# Patient Record
Sex: FEMALE | Race: BLACK OR AFRICAN AMERICAN | Employment: UNEMPLOYED | URBAN - METROPOLITAN AREA
[De-identification: names, ages, dates, MRNs, and addresses within clinical notes are randomized per-mention and may not be internally consistent; named-entity substitution may affect disease eponyms.]

---

## 2018-06-29 ENCOUNTER — HOSPITAL ENCOUNTER (OUTPATIENT)
Dept: WOMENS IMAGING | Age: 58
Discharge: HOME | End: 2018-07-01
Payer: COMMERCIAL

## 2018-06-29 DIAGNOSIS — Z12.31 ENCOUNTER FOR SCREENING MAMMOGRAM FOR BREAST CANCER: ICD-10-CM

## 2018-06-29 PROCEDURE — 77067 SCR MAMMO BI INCL CAD: CPT

## 2018-11-06 ENCOUNTER — APPOINTMENT (OUTPATIENT)
Dept: GENERAL RADIOLOGY | Age: 58
End: 2018-11-06
Payer: COMMERCIAL

## 2018-11-06 ENCOUNTER — HOSPITAL ENCOUNTER (EMERGENCY)
Age: 58
Discharge: HOME | End: 2018-11-06
Attending: EMERGENCY MEDICINE
Payer: COMMERCIAL

## 2018-11-06 VITALS
SYSTOLIC BLOOD PRESSURE: 125 MMHG | WEIGHT: 202 LBS | HEART RATE: 68 BPM | TEMPERATURE: 97.8 F | BODY MASS INDEX: 31.71 KG/M2 | DIASTOLIC BLOOD PRESSURE: 81 MMHG | HEIGHT: 67 IN | OXYGEN SATURATION: 95 % | RESPIRATION RATE: 17 BRPM
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
## 2018-11-06 DIAGNOSIS — M54.6 PAIN IN THORACIC SPINE: ICD-10-CM

## 2018-11-06 DIAGNOSIS — S80.10XA MULTIPLE LEG CONTUSIONS, UNSPECIFIED LATERALITY, INITIAL ENCOUNTER: ICD-10-CM

## 2018-11-06 PROCEDURE — 73590 X-RAY EXAM OF LOWER LEG: CPT

## 2018-11-06 PROCEDURE — 72072 X-RAY EXAM THORAC SPINE 3VWS: CPT

## 2018-11-06 PROCEDURE — 99283 EMERGENCY DEPT VISIT LOW MDM: CPT

## 2018-11-06 PROCEDURE — 6370000000 HC RX 637 (ALT 250 FOR IP): Performed by: EMERGENCY MEDICINE

## 2018-11-06 RX ORDER — CYCLOBENZAPRINE HCL 10 MG
10 TABLET ORAL 3 TIMES DAILY PRN
Qty: 20 TABLET | Refills: 0 | Status: SHIPPED | OUTPATIENT
Start: 2018-11-06 | End: 2018-11-16

## 2018-11-06 RX ORDER — CYCLOBENZAPRINE HCL 10 MG
10 TABLET ORAL ONCE
Status: COMPLETED | OUTPATIENT
Start: 2018-11-06 | End: 2018-11-06

## 2018-11-06 RX ORDER — ACETAMINOPHEN 500 MG
1000 TABLET ORAL ONCE
Status: COMPLETED | OUTPATIENT
Start: 2018-11-06 | End: 2018-11-06

## 2018-11-06 RX ADMIN — ACETAMINOPHEN 1000 MG: 500 TABLET ORAL at 08:05

## 2018-11-06 RX ADMIN — CYCLOBENZAPRINE HYDROCHLORIDE 10 MG: 10 TABLET, FILM COATED ORAL at 08:27

## 2018-11-06 ASSESSMENT — ENCOUNTER SYMPTOMS
EYE PAIN: 0
SHORTNESS OF BREATH: 0
BACK PAIN: 1
SORE THROAT: 0
ABDOMINAL PAIN: 0
NAUSEA: 0
CHEST TIGHTNESS: 0
VOMITING: 0

## 2018-11-06 ASSESSMENT — PAIN SCALES - GENERAL
PAINLEVEL_OUTOF10: 7
PAINLEVEL_OUTOF10: 7

## 2018-11-06 ASSESSMENT — PAIN DESCRIPTION - ORIENTATION: ORIENTATION: MID

## 2018-11-06 ASSESSMENT — PAIN DESCRIPTION - DESCRIPTORS: DESCRIPTORS: ACHING

## 2018-11-06 ASSESSMENT — PAIN DESCRIPTION - LOCATION: LOCATION: BACK;LEG

## 2018-11-06 ASSESSMENT — PAIN DESCRIPTION - PAIN TYPE: TYPE: ACUTE PAIN
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
## 2021-03-31 ENCOUNTER — HOSPITAL ENCOUNTER (OUTPATIENT)
Dept: PHYSICAL THERAPY | Age: 61
Setting detail: THERAPIES SERIES
Discharge: HOME | End: 2021-03-31
Payer: COMMERCIAL

## 2021-03-31 PROCEDURE — 97162 PT EVAL MOD COMPLEX 30 MIN: CPT

## 2021-03-31 ASSESSMENT — PAIN DESCRIPTION - FREQUENCY: FREQUENCY: CONTINUOUS

## 2021-03-31 ASSESSMENT — PAIN - FUNCTIONAL ASSESSMENT: PAIN_FUNCTIONAL_ASSESSMENT: PREVENTS OR INTERFERES WITH ALL ACTIVE AND SOME PASSIVE ACTIVITIES

## 2021-03-31 NOTE — PROGRESS NOTES
therapy. MRI denied per pt. Objective:      Ambulation 1  Device: No Device  Assistance: Independent  Gait Deviations: Slow Soraida  Distance: unlimited in clinic, reports limited in community due to increase pain   Strength RLE  Comment: Hip 4/5 except ext 4-/5, knee 4/5, ankle 4/5  Strength LLE  Comment: Hip 4/5 except ext 4-/5, knee 4/5, ankle 4/5      AROM RLE (degrees)  RLE General AROM: Hip flex 90, abd 40, IR 40, ER 35     AROM LLE (degrees)  LLE General AROM: Hip flex 90, abd 40, IR 35, ER 35        Spine  Lumbar: Flex 75% WNL with mod pain and gowers sign, Ext 50% with min pain, SB 50% B with pain. Bed mobility  Rolling to Left: Independent  Rolling to Right: Independent  Supine to Sit: Independent  Sit to Supine: Independent          Additional Measures  Flexibility: Hamsting flexibility -5°R, -18°L at 90/90 hip/knee position. Special Tests: SLUMP(pain on R), SLR(-),  MARIN(L hip pain), ELY's(tightness B),  Scour(guarded however no pain), distraction (increase pain R hip),  compression(lumbar no change. )     Exercises:   Exercises  Exercise 1: Aquatic  Exercise 2: Amb FRS*  Exercise 3: sink ex's*  Exercise 4: ham stretch*  Exercise 5: piriformis stretch*  Exercise 6: push pull kb*  Exercise 7: push down KB*  Exercise 8: KB trunk rotation*  Exercise 20: HEP: sink ex's pirifomis and hamstretch     *Indicates exercise,modality, or manual techniques to be initiated when appropriate  Assessment:   Body structures, Functions, Activity limitations: Decreased ROM, Decreased strength, Decreased posture, Increased pain, Decreased balance  Assessment: The pt's impairments currently limit functional abilities by 48% including her abilities to  ambulate community distance without increase pain, perform recreational actvities, and perform household/work related duties without pain or limitations  Prognosis: Good  Discharge Recommendations: Continue to assess pending progress        Decision Making: Medium Complexity  History: chronic pain, heart attack, cervical cancer 2017, blood clot L leg on blood thinners x 3 years, R TKR 2008  Exam: Mod oswestry 24/50=52%functional  Clinical Presentation: evolving        Plan  Frequency/Duration:  Plan  Times per week: 2  Plan weeks: 4-6  Current Treatment Recommendations: Strengthening, ROM, Aquatics, Home Exercise Program, Modalities, Manual Therapy - Joint Manipulation, Manual Therapy - Soft Tissue Mobilization, Balance Training     Patient Education  New Education Provided: PT Education: Goals;PT Role;Plan of Care;Home Exercise Program  Patient Education: written aquatic guidelines and HEP provided    POST-PAIN     Pain Rating (0-10 pain scale):  10 /10  Location and pain description same as pre-treatment unless indicated. Action: [x] NA  [] Call Physician  [] Perform HEP  [] Meds as prescribed    Evaluation and patient rights have been reviewed and patient agrees with plan of care. Yes  [x]  No  []   Explain:       Himanshu Fall Risk Assessment  Risk Factor Scale  Score   History of Falls [] Yes  [x] No 25  0 0   Secondary Diagnosis [] Yes  [x] No 15  0 0   Ambulatory Aid [] Furniture  [] Crutches/cane/walker  [x] None/bedrest/wheelchair/nurse 30  15  0 0   IV/Heparin Lock [] Yes  [x] No 20  0 0   Gait/Transferring [] Impaired  [] Weak  [x] Normal/bedrest/immobile 20  10  0 0   Mental Status [] Forgets limitations  [x] Oriented to own ability 15  0 0      Total:0     Based on the Assessment score: check the appropriate box.   [x]  No intervention needed   Low =   Score of 0-24  []  Use standard prevention interventions Moderate =  Score of 24-44   [] Discuss fall prevention strategies   [] Indicate moderate falls risk on eval  []  Use high risk prevention interventions High = Score of 45 and higher   [] Discuss fall prevention strategies   [] Provide supervision during treatment time    Goals  Long term goals  Time Frame for Long term goals : 4-6 weeks  Long term goal 1:

## 2021-03-31 NOTE — PROGRESS NOTES
[address]     Ph: [phone]  Fax: [phone]    [] Certification  [] Recertification [x]  Plan of Care  [] Progress Note [] Discharge      To:  Dr Remy Prather      From:  Terry Griffin, PT  Patient: Mat Rangel     : 1960  Diagnosis: Sacroilits     Date: 3/31/2021  Treatment Diagnosis: LBP       Progress Report Period from:  3/31/2021  to 3/31/2021    Total # of Visits to Date: 1   No Show: 0    Canceled Appointment: 0     OBJECTIVE:       Long Term Goals - Time Frame for Long term goals : 4-6 weeks  Goals Current/ Discharge status Met   Long term goal 1: Indep HEP for symptom management Written HEP provided  for symptom management   [] yes  [x] no   Long term goal 2: Pt demo improved overall function by reporting greater than 70% per functional survey score Exam: Mod oswestry 24/50=52%functional   [] yes  [x] no   Long term goal 3: Improve B hip ext strength 4/5 to allow patient to improve overall walking tolerance community based. Strength RLE  Comment: Hip 4/5 except ext 4-/5, knee 4/5, ankle 4/5  Strength LLE  Comment: Hip 4/5 except ext 4-/5, knee 4/5, ankle 4/5       [] yes  [x] no   Long term goal 4: Pain-free lumbar AROM to WNL allowing an increase in ADL tolerance. Spine  Lumbar: Flex 75% WNL with mod pain and gowers sign, Ext 50% with min pain, SB 50% B with pain.     [] yes  [x] no       Body structures, Functions, Activity limitations: Decreased ROM, Decreased strength, Decreased posture, Increased pain, Decreased balance  Assessment: The pt's impairments currently limit functional abilities by 48% including her abilities to  ambulate community distance without increase pain, perform recreational actvities, and perform household/work related duties without pain or limitations  Special Tests: SLUMP(pain on R), SLR(-),  MARIN(L hip pain), ELY's(tightness B),  Scour(guarded however no pain), distraction (increase pain R hip),  compression(lumbar no change. )  Prognosis: Good  Discharge Recommendations: Continue to assess pending progress      PT Education: Goals;PT Role;Plan of Care;Home Exercise Program  Patient Education: written aquatic guidelines and HEP provided    PLAN: [x] Evaluate and Treat  Frequency/Duration:  Plan  Times per week: 2  Plan weeks: 4-6  Current Treatment Recommendations: Strengthening, ROM, Aquatics, Home Exercise Program, Modalities, Manual Therapy - Joint Manipulation, Manual Therapy - Soft Tissue Mobilization, Balance Training                   Patient Status:[x] Continue/ Initiate plan of Care    [] Discharge PT. Recommend pt continue with HEP. [] Additional visits requested, Please re-certify for additional visits:          Signature: Electronically signed by Patricia Betancur PT on 3/31/21 at 9:04 AM EDT      If you have any questions or concerns, please don't hesitate to call. Thank you for your referral.    I have reviewed this plan of care and certify a need for medically necessary rehabilitation services.     Physician Signature:__________________________________________________________  Date:  Please sign and return

## 2021-04-06 ENCOUNTER — HOSPITAL ENCOUNTER (OUTPATIENT)
Dept: PHYSICAL THERAPY | Age: 61
Setting detail: THERAPIES SERIES
Discharge: HOME | End: 2021-04-06
Payer: COMMERCIAL

## 2021-04-06 PROCEDURE — 97113 AQUATIC THERAPY/EXERCISES: CPT

## 2021-04-06 NOTE — PROGRESS NOTES
[address] [address]  Outpatient Physical Therapy    Treatment Note        Date: 2021  Patient: Braden Prather  : 1960  ACCT #: [de-identified]  Referring Practitioner: Dr Ashlie Mcdaniel  Diagnosis: Sacroilits    Visit Information:  PT Visit Information  Onset Date: 20(ongoing, chronic)  [facility]  Total # of Visits Approved: 30  Total # of Visits to Date: 2  No Show: 0  Canceled Appointment: 0  Progress Note Counter:     Subjective: Pt reports 5/10 LBP achy. \"It's been bothering me for a while. \"  Comments: RTD after therapy. MRI denied per pt. HEP Compliance:  [x] Good [] Fair [] Poor [] Reports not doing due to:    Vital Signs  Patient Currently in Pain: Yes   Pain Screening  Patient Currently in Pain: Yes  Pain Assessment  Pain Level: 5  Pain Location: Back  Pain Descriptors: Aching    OBJECTIVE:   Exercises  Exercise 1: Aquatic  Exercise 2: Amb FRS x 2  Exercise 3: sink ex's Circles x 10  Exercise 4: ham stretch at stairs 30 sec x 3  Exercise 5: piriformis stretch sitting 30 sec x 3  Exercise 6: push pull kb x 10  Exercise 8: KB trunk rotation x 10       Strength: [x] NT  [] MMT completed:     ROM: [x] NT  [] ROM measurements:            *Indicates exercise, modality, or manual techniques to be initiated when appropriate    Assessment:           Assessment: Initiated aquatic exercises as stated in exercise section. Much cueing for core and avoiding painful movements. Good tolerance to treatment. Treatment Diagnosis: LBP          Goals:       Long term goals  Time Frame for Long term goals : 4-6 weeks  Long term goal 1: Indep HEP for symptom management  Long term goal 2: Pt demo improved overall function by reporting greater than 70% per functional survey score  Long term goal 3: Improve B hip ext strength 4/5 to allow patient to improve overall walking tolerance community based.   Long term goal 4: Pain-free lumbar AROM to WNL allowing an increase in ADL tolerance. Progress toward goals:Progressing towards all goals. POST-PAIN       Pain Rating (0-10 pain scale):  2 /10   Location and pain description same as pre-treatment unless indicated. Action: [] NA   [x] Perform HEP  [] Meds as prescribed  [] Modalities as prescribed   [] Call Physician     Frequency/Duration:  Plan  Times per week: 2  Plan weeks: 4-6  Current Treatment Recommendations: Strengthening, ROM, Aquatics, Home Exercise Program, Modalities, Manual Therapy - Joint Manipulation, Manual Therapy - Soft Tissue Mobilization, Balance Training     Pt to continue current HEP. See objective section for any therapeutic exercise changes, additions or modifications this date.          PT Individual Minutes  Time In: 7104  Time Out: 9788  Minutes: 33  Timed Code Treatment Minutes: 33 Minutes  Procedure Minutes:0     Timed Activity Minutes Units   Aquatics 33 2       Signature:  Electronically signed by Sriram Agee PTA on 4/6/21 at 4:09 PM EDT

## 2021-04-08 ENCOUNTER — HOSPITAL ENCOUNTER (OUTPATIENT)
Dept: PHYSICAL THERAPY | Age: 61
Setting detail: THERAPIES SERIES
Discharge: HOME | End: 2021-04-08
Payer: COMMERCIAL

## 2021-04-08 PROCEDURE — 97113 AQUATIC THERAPY/EXERCISES: CPT

## 2021-04-08 ASSESSMENT — PAIN DESCRIPTION - FREQUENCY: FREQUENCY: CONTINUOUS

## 2021-04-08 ASSESSMENT — PAIN DESCRIPTION - LOCATION: LOCATION: BACK

## 2021-04-08 NOTE — PROGRESS NOTES
[address] [address]  Outpatient Physical Therapy    Treatment Note        Date: 2021  Patient: Vinita Greer  : 1960  ACCT #: [de-identified]  Referring Practitioner: Dr Linda Varghese  Diagnosis: Sacroilits    Visit Information:  PT Visit Information  Onset Date: 20(ongoing, chronic)  [facility]: [facility]  Total # of Visits Approved: 30  Total # of Visits to Date: 3  No Show: 0  Canceled Appointment: 0  Progress Note Counter: 3 /8-12    Subjective: Pt presenting to appt stating \"I'm hurtin\" after last pool session. Pt c/o inc pain and \"spasms\" in B LE's, primary in Rt inner thigh. Comments: RTD after therapy. MRI denied per pt. HEP Compliance:  [x] Good [] Fair [] Poor [] Reports not doing due to:    Vital Signs  Patient Currently in Pain: Yes   Pain Screening  Patient Currently in Pain: Yes  Pain Assessment  Pain Assessment: 0-10  Pain Level: 7  Pain Location: Back  Pain Descriptors: Spasm; Sore  Pain Frequency: Continuous    OBJECTIVE:   Exercises  Exercise 1: Aquatic  Exercise 2: Amb FRS x 2  Exercise 3: sink ex's Circles x 10  Exercise 4: ham stretch at stairs 30 sec x 3  Exercise 5: piriformis stretch sitting 30 sec x 3  Exercise 6: push pull kb x 10  Exercise 7: push down KB x10  Exercise 8: KB trunk rotation x 10  Exercise 9: DB bike 3 min, hang 5 min    Strength: [x] NT  [] MMT completed:     ROM: [x] NT  [] ROM measurements:     Assessment:    Assessment: Cont'd aquatics program per POC w/ addition of DB bicycle and hang to further address LBP and spinal pressure. Pt noting gradual decrease in pain level throughout duration of session noting most relief when NWB in deep end. Pt exiting pool w/ 3/10 pain vs initial 7/10 pre tx.   Treatment Diagnosis: LBP     Goals:   Long term goals  Time Frame for Long term goals : 4-6 weeks  Long term goal 1: Indep HEP for symptom management  Long term goal 2: Pt demo improved overall function by reporting greater than 70% per functional

## 2021-04-12 ENCOUNTER — HOSPITAL ENCOUNTER (OUTPATIENT)
Dept: PHYSICAL THERAPY | Age: 61
Setting detail: THERAPIES SERIES
Discharge: HOME | End: 2021-04-12
Payer: COMMERCIAL

## 2021-04-12 PROCEDURE — 97113 AQUATIC THERAPY/EXERCISES: CPT

## 2021-04-12 ASSESSMENT — PAIN SCALES - GENERAL: PAINLEVEL_OUTOF10: 7

## 2021-04-12 NOTE — PROGRESS NOTES
[address] [address]  Outpatient Physical Therapy    Treatment Note        Date: 2021  Patient: Vinita Greer  : 1960  ACCT #: [de-identified]  Referring Practitioner: Dr Linda Varghese  Diagnosis: Sacroilits    Visit Information:  PT Visit Information  Onset Date: 20(ongoing, chronic)  PT Insurance Information: [employer]  Total # of Visits Approved: 30  Total # of Visits to Date: 4  No Show: 0  Canceled Appointment: 0  Progress Note Counter:     Subjective: pain today is 7/10 in my LB, no radiating pain  Comments: RTD after therapy. MRI denied per pt.   HEP Compliance:  [x] Good [] Fair [] Poor [] Reports not doing due to:    Vital Signs  Patient Currently in Pain: Yes   Pain Screening  Patient Currently in Pain: Yes  Pain Assessment  Pain Level: 7  Pain Location: Back  Pain Descriptors: Sore    OBJECTIVE:   Exercises  Exercise 2: Amb FRS x 4  Exercise 3: sink ex's and circles x 20  Exercise 4: ham stretch at stairs 30 sec x 3  Exercise 5: piriformis stretch sitting 30 sec x 3  Exercise 6: push pull kb x 25  Exercise 7: push down KB x 25  Exercise 8: KB trunk rotation x 25  Exercise 9: DB bike, scissor kicks, 5 min, hang 3 min       Strength: [x] NT  [] MMT completed:     ROM: [x] NT  [] ROM measurements:       *Indicates exercise, modality, or manual techniques to be initiated when appropriate    Assessment:           Assessment: no UE support used during session, increased laps,and number of reps w/ exercises, very good tolerance to all, no c/o pain during session, vc's to keep stretches in comfort range  Treatment Diagnosis: LBP          Goals:       Long term goals  Time Frame for Long term goals : 4-6 weeks  Long term goal 1: Indep HEP for symptom management  Long term goal 2: Pt demo improved overall function by reporting greater than 70% per functional survey score  Long term goal 3: Improve B hip ext strength 4/5 to allow patient to improve overall walking tolerance community based.  Long term goal 4: Pain-free lumbar AROM to WNL allowing an increase in ADL tolerance. Progress toward goals:ongoing    POST-PAIN       Pain Rating (0-10 pain scale):  5 /10   Location and pain description same as pre-treatment unless indicated. Action: [] NA   [x] Perform HEP  [] Meds as prescribed  [] Modalities as prescribed   [] Call Physician     Frequency/Duration:  Plan  Times per week: 2  Plan weeks: 4-6  Current Treatment Recommendations: Strengthening, ROM, Aquatics, Home Exercise Program, Modalities, Manual Therapy - Joint Manipulation, Manual Therapy - Soft Tissue Mobilization, Balance Training     Pt to continue current HEP. See objective section for any therapeutic exercise changes, additions or modifications this date.          PT Individual Minutes  Time In: 4594  Time Out: 1200  Minutes: 38  Timed Code Treatment Minutes: 38 Minutes  Procedure Minutes:0     Timed Activity Minutes Units   Aquatic Ex 38 3       Signature:  Electronically signed by Ina Villasenor PTA on 4/12/21 at 11:56 AM EDT

## 2021-04-15 ENCOUNTER — HOSPITAL ENCOUNTER (OUTPATIENT)
Dept: PHYSICAL THERAPY | Age: 61
Setting detail: THERAPIES SERIES
Discharge: HOME | End: 2021-04-15
Payer: COMMERCIAL

## 2021-04-15 PROCEDURE — 97113 AQUATIC THERAPY/EXERCISES: CPT

## 2021-04-15 ASSESSMENT — PAIN DESCRIPTION - LOCATION: LOCATION: BACK

## 2021-04-15 ASSESSMENT — PAIN DESCRIPTION - DESCRIPTORS: DESCRIPTORS: ACHING;SORE

## 2021-04-15 NOTE — PROGRESS NOTES
[address] [address]  Outpatient Physical Therapy    Treatment Note        Date: 4/15/2021  Patient: Rose London  : 1960  ACCT #: [de-identified]  Referring Practitioner: Dr Izabel Terrell  Diagnosis: Sacroilits    Visit Information:  PT Visit Information  Onset Date: 20(ongoing, chronic)  [facility]  Total # of Visits Approved: 30  Total # of Visits to Date: 5  No Show: 0  Canceled Appointment: 0  Progress Note Counter:     Subjective: pain today is 7/10 in my LB  Comments: RTD after therapy. MRI denied per pt. HEP Compliance:  [x] Good [] Fair [] Poor [] Reports not doing due to:    Vital Signs  Patient Currently in Pain: Yes   Pain Screening  Patient Currently in Pain: Yes  Pain Assessment  Pain Location: Back  Pain Orientation: Lower;Right  Pain Descriptors: Aching; Sore    OBJECTIVE:   Exercises  Exercise 2: Amb FRS and SLS high knee march x 4  Exercise 3: sink ex's and circles x 20  Exercise 4: ham stretch at stairs 30 sec x 3  Exercise 5: piriformis stretch sitting 30 sec x 3  Exercise 6: push pull kb x 25  Exercise 7: push down KB x 25  Exercise 8: KB trunk rotation x 25  Exercise 9: DB bike, scissor kicks, 5 min, hang 3 min    Strength: [x] NT  [] MMT completed:   ROM: [] NT  [x] ROM measurements:         Spine  Lumbar: flexion slightly >75%     *Indicates exercise, modality, or manual techniques to be initiated when appropriate    Assessment:           Assessment: continued with current ecercises and stretches, nothing new added due to pain level stated, good tolerance to session, occasional hand hold used today  Treatment Diagnosis: LBP          Goals:       Long term goals  Time Frame for Long term goals : 4-6 weeks  Long term goal 1: Indep HEP for symptom management  Long term goal 2: Pt demo improved overall function by reporting greater than 70% per functional survey score  Long term goal 3: Improve B hip ext strength 4/5 to allow patient to improve overall walking tolerance community based. Long term goal 4: Pain-free lumbar AROM to WNL allowing an increase in ADL tolerance. Progress toward goals:ongoing    POST-PAIN       Pain Rating (0-10 pain scale):   6/10   Location and pain description same as pre-treatment unless indicated. Action: [] NA   [x] Perform HEP  [] Meds as prescribed  [] Modalities as prescribed   [] Call Physician     Frequency/Duration:  Plan  Times per week: 2  Plan weeks: 4-6  Current Treatment Recommendations: Strengthening, ROM, Aquatics, Home Exercise Program, Modalities, Manual Therapy - Joint Manipulation, Manual Therapy - Soft Tissue Mobilization, Balance Training     Pt to continue current HEP. See objective section for any therapeutic exercise changes, additions or modifications this date.          PT Individual Minutes  Time In: 5620  Time Out: 9317  Minutes: 38  Timed Code Treatment Minutes: 38 Minutes  Procedure Minutes:0     Timed Activity Minutes Units   Aquatic Ex 38 3       Signature:  Electronically signed by Mike Renae PTA on 4/15/21 at 11:56 AM EDT

## 2021-04-19 ENCOUNTER — HOSPITAL ENCOUNTER (OUTPATIENT)
Dept: PHYSICAL THERAPY | Age: 61
Setting detail: THERAPIES SERIES
Discharge: HOME | End: 2021-04-19
Payer: COMMERCIAL

## 2021-04-19 NOTE — PROGRESS NOTES
Therapy                            Cancellation/No-show Note      Date:  2021  Patient Name:  Kye Pickett  :  1960   MRN:  [id number]  Referring Practitioner: Dr Rosina Merritt  Diagnosis: Sacroilits    Visit Information:  PT Visit Information  Onset Date: 20(ongoing, chronic)  PT Insurance Information: [employer]  Total # of Visits Approved: 30  Total # of Visits to Date: 5  No Show: 0  Canceled Appointment: 1  Progress Note Counter: - (cx 21)    For today's appointment patient:  [x]  Cancelled  []  Rescheduled appointment  []  No-show   []  Called pt to remind of next appointment     Reason given by patient:  []  Patient ill  []  Conflicting appointment  []  No transportation    []  Conflict with work  []  No reason given  [x]  Other:  Unable to make it    [x] Pt has future appointments scheduled, no follow up needed  [] Pt requests to be on hold.     Reason:   If > 2 weeks please discuss with therapist.  [] Therapist to call pt for follow up     Comments:       Signature: Electronically signed by Randy Cruz PT on 21 at 8:31 AM EDT

## 2021-04-22 ENCOUNTER — HOSPITAL ENCOUNTER (OUTPATIENT)
Dept: PHYSICAL THERAPY | Age: 61
Setting detail: THERAPIES SERIES
Discharge: HOME | End: 2021-04-22
Payer: COMMERCIAL

## 2021-04-22 PROCEDURE — 97113 AQUATIC THERAPY/EXERCISES: CPT

## 2021-04-22 ASSESSMENT — PAIN SCALES - GENERAL: PAINLEVEL_OUTOF10: 7

## 2021-04-22 ASSESSMENT — PAIN DESCRIPTION - LOCATION: LOCATION: BACK

## 2021-04-22 NOTE — PROGRESS NOTES
[address] [address]  Outpatient Physical Therapy    Treatment Note        Date: 2021  Patient: Adolfo Garrido  : 1960  ACCT #: [de-identified]  Referring Practitioner: Dr Perez First  Diagnosis: Sacroilits    Visit Information:  PT Visit Information  Onset Date: 20(ongoing, chronic)  PT Insurance Information: [employer]  Total # of Visits Approved: 30  Total # of Visits to Date: 6  No Show: 0  Canceled Appointment: 1  Progress Note Counter:  Corrected count    Subjective: Pt reports pain in LB 7/10 sore achy. Had episode of \"tingling\" in LB yesterday  Comments: RTD after therapy. MRI denied per pt. HEP Compliance:  [x] Good [] Fair [] Poor [] Reports not doing due to:    Vital Signs  Patient Currently in Pain: Yes   Pain Screening  Patient Currently in Pain: Yes  Pain Assessment  Pain Level: 7  Patient's Stated Pain Goal: No pain  Pain Location: Back  Pain Orientation: Lower  Pain Descriptors: Aching; Sore    OBJECTIVE:   Exercises  Exercise 1: Aquatic  Exercise 2: Amb FRS and SLS high knee march x 4  Exercise 3: sink ex's and circles x 22  Exercise 4: ham stretch at stairs 30 sec x 3  Exercise 5: piriformis stretch sitting 30 sec x 3  Exercise 6: push pull kb x 25  Exercise 7: push down KB x 25  Exercise 8: KB trunk rotation x 25  Exercise 9: DB hang 3 min only due to time     Strength: [x] NT  [] MMT completed:     ROM: [x] NT  [] ROM measurements:            *Indicates exercise, modality, or manual techniques to be initiated when appropriate    Assessment: Body structures, Functions, Activity limitations: Decreased ROM, Decreased strength, Decreased posture, Increased pain, Decreased balance  Assessment: Continue to progress current aquatic exercises, Verbal cues forcore posture and to avoid pain increasing exercises. Pt 40% better since starting therapy.   Treatment Diagnosis: LBP          Goals:       Long term goals  Time Frame for Long term goals : 4-6 weeks  Long term goal 1: Indep HEP for symptom management  Long term goal 2: Pt demo improved overall function by reporting greater than 70% per functional survey score  Long term goal 3: Improve B hip ext strength 4/5 to allow patient to improve overall walking tolerance community based. Long term goal 4: Pain-free lumbar AROM to WNL allowing an increase in ADL tolerance. Progress toward goals: progressing towards goals    POST-PAIN       Pain Rating (0-10 pain scale):   4/10   Location and pain description same as pre-treatment unless indicated. Action: [] NA   [x] Perform HEP  [] Meds as prescribed  [x] Modalities as prescribed   [] Call Physician     Frequency/Duration:  Plan  Times per week: 2  Plan weeks: 4-6  Current Treatment Recommendations: Strengthening, ROM, Aquatics, Home Exercise Program, Modalities, Manual Therapy - Joint Manipulation, Manual Therapy - Soft Tissue Mobilization, Balance Training     Pt to continue current HEP. See objective section for any therapeutic exercise changes, additions or modifications this date.          PT Individual Minutes  Time In: 0430  Time Out: 5362  Minutes: 40  Timed Code Treatment Minutes: 40 Minutes  Procedure Minutes: 0     Timed Activity Minutes Units   Aquatics 40 3       Signature:  Electronically signed by Virgil Krause PTA on 4/22/21 at 11:57 AM EDT

## 2021-04-26 ENCOUNTER — HOSPITAL ENCOUNTER (OUTPATIENT)
Dept: PHYSICAL THERAPY | Age: 61
Setting detail: THERAPIES SERIES
Discharge: HOME | End: 2021-04-26
Payer: COMMERCIAL

## 2021-04-26 PROCEDURE — 97113 AQUATIC THERAPY/EXERCISES: CPT

## 2021-04-26 ASSESSMENT — PAIN DESCRIPTION - PROGRESSION: CLINICAL_PROGRESSION: GRADUALLY IMPROVING

## 2021-04-26 ASSESSMENT — PAIN DESCRIPTION - LOCATION: LOCATION: BACK

## 2021-04-26 NOTE — PROGRESS NOTES
Frame for Long term goals : 4-6 weeks  Long term goal 1: Indep HEP for symptom management  Long term goal 2: Pt demo improved overall function by reporting greater than 70% per functional survey score  Long term goal 3: Improve B hip ext strength 4/5 to allow patient to improve overall walking tolerance community based. Long term goal 4: Pain-free lumbar AROM to WNL allowing an increase in ADL tolerance. Progress toward goals:ongoing    POST-PAIN       Pain Rating (0-10 pain scale):   2/10   Location and pain description same as pre-treatment unless indicated. Action: [] NA   [x] Perform HEP  [] Meds as prescribed  [] Modalities as prescribed   [] Call Physician     Frequency/Duration:  Plan  Times per week: 2  Plan weeks: 4-6  Current Treatment Recommendations: Strengthening, ROM, Aquatics, Home Exercise Program, Modalities, Manual Therapy - Joint Manipulation, Manual Therapy - Soft Tissue Mobilization, Balance Training     Pt to continue current HEP. See objective section for any therapeutic exercise changes, additions or modifications this date.          PT Individual Minutes  Time In: 1029  Time Out: 8310  Minutes: 38  Timed Code Treatment Minutes: 38 Minutes  Procedure Minutes:0     Timed Activity Minutes Units   aquatic Ex 38 3       Signature:  Electronically signed by Travis Cheek PTA on 4/26/21 at 9:56 AM EDT

## 2021-04-29 ENCOUNTER — HOSPITAL ENCOUNTER (OUTPATIENT)
Dept: PHYSICAL THERAPY | Age: 61
Setting detail: THERAPIES SERIES
Discharge: HOME | End: 2021-04-29
Payer: COMMERCIAL

## 2021-04-29 PROCEDURE — 97113 AQUATIC THERAPY/EXERCISES: CPT

## 2021-04-29 ASSESSMENT — PAIN DESCRIPTION - PROGRESSION: CLINICAL_PROGRESSION: GRADUALLY IMPROVING

## 2021-04-29 ASSESSMENT — PAIN DESCRIPTION - DESCRIPTORS: DESCRIPTORS: SORE

## 2021-04-29 NOTE — PROGRESS NOTES
[address] [address]  Outpatient Physical Therapy    Treatment Note        Date: 2021  Patient: Louann Buchanan  : 1960  ACCT #: [de-identified]  Referring Practitioner: Dr Adolfo Gillette  Diagnosis: Sacroilits    Visit Information:  PT Visit Information  Onset Date: 20(ongoing, chronic)  PT Insurance Information: [facility]  Total # of Visits Approved: 30  Total # of Visits to Date: 8  No Show: 0  Canceled Appointment: 1  Progress Note Counter:  Corrected count    Subjective: Pt reports occ abdominal muscle spasms, \"It's probably because I'm out of shape\" LBP 7/10, feels PT is helping. Was able to walk around the park with decreased LBP, reports LE fatigue. Comments: RTD after therapy. MRI denied per pt. HEP Compliance:  [x] Good [] Fair [] Poor [] Reports not doing due to:    Vital Signs  Patient Currently in Pain: Yes   Pain Screening  Patient Currently in Pain: Yes  Pain Assessment  Pain Level: 7  Pain Location: Back  Pain Orientation: Lower  Pain Descriptors: Sore  Clinical Progression: Gradually improving    OBJECTIVE:   Exercises  Exercise 1: Aquatic:  Exercise 2: Amb FRS and SLS high knee march x 4  Exercise 4: ham stretch at stairs 30 sec x 3  Exercise 6: push pull kb x 25  Exercise 7: push down KB x 25  Exercise 8: KB trunk rotation x 25  Exercise 9: DB exercises, hip flex/ext, scissor kicks, hip abd, bicycle, 8 min total  Exercise 11: lunges x 10 b/l  Exercise 12: Step ups F/L x10 Bill             *Indicates exercise, modality, or manual techniques to be initiated when appropriate    Assessment: Body structures, Functions, Activity limitations: Decreased ROM, Decreased strength, Decreased posture, Increased pain, Decreased balance  Assessment: Progressed LE strengthening exercises with step ups. Pt reporting improved balance while performing marching gait drill. Pt feels she could benefit from continuing with PT to further improve strength and decrease pain.  No pain reported at end of session. Treatment Diagnosis: LBP  Prognosis: Good       Goals:       Long term goals  Time Frame for Long term goals : 4-6 weeks  Long term goal 1: Indep HEP for symptom management  Long term goal 2: Pt demo improved overall function by reporting greater than 70% per functional survey score  Long term goal 3: Improve B hip ext strength 4/5 to allow patient to improve overall walking tolerance community based. Long term goal 4: Pain-free lumbar AROM to WNL allowing an increase in ADL tolerance. Progress toward goals: Strength    POST-PAIN       Pain Rating (0-10 pain scale): 0  /10   Location and pain description same as pre-treatment unless indicated. Action: [x] NA   [] Perform HEP  [] Meds as prescribed  [] Modalities as prescribed   [] Call Physician     Frequency/Duration:  Plan  Times per week: 2  Plan weeks: 4-6  Current Treatment Recommendations: Strengthening, ROM, Aquatics, Home Exercise Program, Modalities, Manual Therapy - Joint Manipulation, Manual Therapy - Soft Tissue Mobilization, Balance Training     Pt to continue current HEP. See objective section for any therapeutic exercise changes, additions or modifications this date.          PT Individual Minutes  Time In: 3927  Time Out: 1000  Minutes: 42  Timed Code Treatment Minutes: 42 Minutes  Procedure Minutes:0   Timed Activity Minutes Units   Aquatic 42 3       Signature:  Electronically signed by Gavin Pillai PTA on 4/29/21 at 8:02 AM EDT

## 2021-05-03 ENCOUNTER — HOSPITAL ENCOUNTER (OUTPATIENT)
Dept: PHYSICAL THERAPY | Age: 61
Setting detail: THERAPIES SERIES
Discharge: HOME | End: 2021-05-03
Payer: COMMERCIAL

## 2021-05-03 NOTE — PROGRESS NOTES
Therapy                            Cancellation/No-show Note      Date:  5/3/2021  Patient Name:  Vinita Greer  :  1960   MRN:  [id number]  Referring Practitioner: Dr Linda Varghese  Diagnosis: Sacroilits    Visit Information:  PT Visit Information  Onset Date: 20(ongoing, chronic)  PT Insurance Information: [employer]  Total # of Visits Approved: 30  Total # of Visits to Date: 8  No Show: 0  Canceled Appointment: 2  Progress Note Counter: - (cx on 5/3/21)    For today's appointment patient:  [x]  Cancelled  []  Rescheduled appointment  []  No-show   []  Called pt to remind of next appointment     Reason given by patient:  []  Patient ill  [x]  Conflicting appointment  []  No transportation    []  Conflict with work  []  No reason given  []  Other:      [x] Pt has future appointments scheduled, no follow up needed  [] Pt requests to be on hold.     Reason:   If > 2 weeks please discuss with therapist.  [] Therapist to call pt for follow up     Comments:       Signature: Electronically signed by Dayton Marsh PTA on 5/3/21 at 7:38 AM EDT

## 2021-05-07 ENCOUNTER — HOSPITAL ENCOUNTER (OUTPATIENT)
Dept: PHYSICAL THERAPY | Age: 61
Setting detail: THERAPIES SERIES
Discharge: HOME | End: 2021-05-07
Payer: COMMERCIAL

## 2021-05-07 NOTE — PROGRESS NOTES
[address]       Physical Therapy  Cancellation/No-show Note  Patient Name:  Candy Jeffers  :  1960   Date:  2021  Referring Practitioner: Dr Scout Jones  Diagnosis: Sacroilits    Visit Information:  PT Visit Information  Onset Date: 20(ongoing, chronic)  PT Insurance Information: [employer]  Total # of Visits Approved: 30  Total # of Visits to Date: 8  No Show: 0  Canceled Appointment: 3  Progress Note Counter: - (cx on 21)    For today's appointment patient:  [x]  Cancelled  []  Rescheduled appointment  []  No-show   []  Called pt to remind of next appointment     Reason given by patient:  []  Patient ill  []  Conflicting appointment  []  No transportation    []  Conflict with work  [x]  No reason given  []  Other:       Comments: Called pt to inform of attendance policy and see if would like to schedule additional appts.       Signature: Electronically signed by Georgiana Baez PTA on 21 at 1:40 PM EDT

## 2021-05-07 NOTE — PROGRESS NOTES
[address]                                  Ph: [phone]  Fax: [phone]     []? Certification  []? Recertification      []? Plan of Care  []? Progress Note [x]? Discharge                            To:  Dr Nik Law      From:  Kevin Maza, PT  Patient: Will Sanches     : 1960  Diagnosis: Sacroilits     Date: 2021  Treatment Diagnosis: LBP     Progress Report Period from:  3/31/2021  to 2021     Total # of Visits to Date: 8   No Show: 0    Canceled Appointment: 3      OBJECTIVE:         Long Term Goals - Time Frame for Long term goals : 4-6 weeks  Goals Current/ Discharge status Met   Long term goal 1: Indep HEP for symptom management Written Aquatic HEP provided  for symptom management    [x]? yes  []? no   Long term goal 2: Pt demo improved overall function by reporting greater than 70% per functional survey score Not tested due to unexpected discharge. []? yes  [x]? no   Long term goal 3: Improve B hip ext strength 4/5 to allow patient to improve overall walking tolerance community based. Not tested due to unexpected discharge. []? yes  [x]? no   Long term goal 4: Pain-free lumbar AROM to WNL allowing an increase in ADL tolerance. Not tested due to unexpected discharge. []? yes  [x]? no         Body structures, Functions, Activity limitations: Decreased ROM, Decreased strength, Decreased posture, Increased pain, Decreased balance  Assessment: Pt cancelled last two scheduled appts, upon calling to schedule pt requested to DC PT at this time. Prognosis: Good  PT Education: Goals;PT Role;Plan of Care;Home Exercise Program  Patient Education: written aquatic guidelines and HEP provided     PLAN: [x]? Frequency/Duration:                                              Patient Status:[]? Continue/ Initiate plan of Care                          [x]? Discharge PT.   Recommend pt continue with HEP. []? Additional visits requested, Please re-certify for additional visits:                                                       Signature: Electronically signed by Christopher Faith PT on 5/7/2021 at 3:22 PM          If you have any questions or concerns, please don't hesitate to call.   Thank you for your referral.     I have reviewed this plan of care and certify a need for medically necessary rehabilitation services.     Physician Signature:__________________________________________________________  Date:  Please sign and return

## 2021-07-11 ENCOUNTER — HOSPITAL ENCOUNTER (EMERGENCY)
Age: 61
Discharge: HOME | End: 2021-07-11
Attending: EMERGENCY MEDICINE
Payer: COMMERCIAL

## 2021-07-11 ENCOUNTER — APPOINTMENT (OUTPATIENT)
Dept: CT IMAGING | Age: 61
End: 2021-07-11
Payer: COMMERCIAL

## 2021-07-11 VITALS
BODY MASS INDEX: 31.71 KG/M2 | DIASTOLIC BLOOD PRESSURE: 74 MMHG | SYSTOLIC BLOOD PRESSURE: 130 MMHG | WEIGHT: 202 LBS | HEART RATE: 74 BPM | TEMPERATURE: 98.3 F | HEIGHT: 67 IN | OXYGEN SATURATION: 99 % | RESPIRATION RATE: 19 BRPM
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
## 2021-07-11 DIAGNOSIS — R07.89 CHEST WALL PAIN: Primary | ICD-10-CM

## 2021-07-11 LAB
ALBUMIN SERPL-MCNC: 4.1 G/DL (ref 3.5–4.6)
ALP BLD-CCNC: 132 U/L (ref 40–130)
ALT SERPL-CCNC: 14 U/L (ref 0–33)
ANION GAP SERPL CALCULATED.3IONS-SCNC: 8 MEQ/L (ref 9–15)
APTT: 30.6 SEC (ref 24.4–36.8)
AST SERPL-CCNC: 18 U/L (ref 0–35)
BASOPHILS ABSOLUTE: 0 K/UL (ref 0–0.2)
BASOPHILS RELATIVE PERCENT: 0.9 %
BILIRUB SERPL-MCNC: 0.3 MG/DL (ref 0.2–0.7)
BUN BLDV-MCNC: 13 MG/DL (ref 8–23)
CALCIUM SERPL-MCNC: 9.1 MG/DL (ref 8.5–9.9)
CHLORIDE BLD-SCNC: 102 MEQ/L (ref 95–107)
CO2: 27 MEQ/L (ref 20–31)
CREAT SERPL-MCNC: 0.97 MG/DL (ref 0.5–0.9)
EOSINOPHILS ABSOLUTE: 0.1 K/UL (ref 0–0.7)
EOSINOPHILS RELATIVE PERCENT: 1 %
GFR AFRICAN AMERICAN: >60
GFR NON-AFRICAN AMERICAN: 58.2
GLOBULIN: 3.7 G/DL (ref 2.3–3.5)
GLUCOSE BLD-MCNC: 124 MG/DL (ref 70–99)
HCT VFR BLD CALC: 38.3 % (ref 37–47)
HEMOGLOBIN: 12.6 G/DL (ref 12–16)
INR BLD: 1.1
LYMPHOCYTES ABSOLUTE: 2.8 K/UL (ref 1–4.8)
LYMPHOCYTES RELATIVE PERCENT: 39 %
MCH RBC QN AUTO: 28.1 PG (ref 27–31.3)
MCHC RBC AUTO-ENTMCNC: 32.9 % (ref 33–37)
MCV RBC AUTO: 85.2 FL (ref 82–100)
MONOCYTES ABSOLUTE: 0.7 K/UL (ref 0.2–0.8)
MONOCYTES RELATIVE PERCENT: 9.5 %
NEUTROPHILS ABSOLUTE: 3.7 K/UL (ref 1.4–6.5)
NEUTROPHILS RELATIVE PERCENT: 51 %
PDW BLD-RTO: 15.5 % (ref 11.5–14.5)
PLATELET # BLD: 247 K/UL (ref 130–400)
PLATELET SLIDE REVIEW: NORMAL
POIKILOCYTES: ABNORMAL
POLYCHROMASIA: ABNORMAL
POTASSIUM SERPL-SCNC: 4.1 MEQ/L (ref 3.4–4.9)
PROTHROMBIN TIME: 14 SEC (ref 12.3–14.9)
RBC # BLD: 4.49 M/UL (ref 4.2–5.4)
SODIUM BLD-SCNC: 137 MEQ/L (ref 135–144)
TARGET CELLS: ABNORMAL
TOTAL PROTEIN: 7.8 G/DL (ref 6.3–8)
WBC # BLD: 7.2 K/UL (ref 4.8–10.8)

## 2021-07-11 PROCEDURE — 99283 EMERGENCY DEPT VISIT LOW MDM: CPT

## 2021-07-11 PROCEDURE — 36415 COLL VENOUS BLD VENIPUNCTURE: CPT

## 2021-07-11 PROCEDURE — 80053 COMPREHEN METABOLIC PANEL: CPT

## 2021-07-11 PROCEDURE — 85610 PROTHROMBIN TIME: CPT

## 2021-07-11 PROCEDURE — 6360000004 HC RX CONTRAST MEDICATION: Performed by: PHYSICIAN ASSISTANT

## 2021-07-11 PROCEDURE — 85730 THROMBOPLASTIN TIME PARTIAL: CPT

## 2021-07-11 PROCEDURE — 71260 CT THORAX DX C+: CPT

## 2021-07-11 PROCEDURE — 85025 COMPLETE CBC W/AUTO DIFF WBC: CPT

## 2021-07-11 RX ORDER — CYCLOBENZAPRINE HCL 10 MG
10 TABLET ORAL 3 TIMES DAILY PRN
Qty: 21 TABLET | Refills: 0 | Status: SHIPPED | OUTPATIENT
Start: 2021-07-11 | End: 2021-07-18

## 2021-07-11 RX ORDER — NAPROXEN 500 MG/1
500 TABLET ORAL 2 TIMES DAILY
Qty: 14 TABLET | Refills: 0 | Status: SHIPPED | OUTPATIENT
Start: 2021-07-11 | End: 2022-05-24

## 2021-07-11 RX ADMIN — IOPAMIDOL 100 ML: 755 INJECTION, SOLUTION INTRAVENOUS at 16:10

## 2021-07-11 ASSESSMENT — ENCOUNTER SYMPTOMS
SORE THROAT: 0
RHINORRHEA: 0
ABDOMINAL PAIN: 0
DIARRHEA: 0
SHORTNESS OF BREATH: 0
NAUSEA: 0
ABDOMINAL DISTENTION: 0
COLOR CHANGE: 0
VOMITING: 0
EYE DISCHARGE: 0
CONSTIPATION: 0

## 2021-07-11 ASSESSMENT — PAIN DESCRIPTION - LOCATION: LOCATION: CHEST

## 2021-07-11 ASSESSMENT — PAIN SCALES - GENERAL: PAINLEVEL_OUTOF10: 9

## 2021-07-11 ASSESSMENT — PAIN DESCRIPTION - DESCRIPTORS: DESCRIPTORS: ACHING

## 2021-07-11 ASSESSMENT — PAIN DESCRIPTION - PAIN TYPE: TYPE: ACUTE PAIN;CHRONIC PAIN

## 2021-07-11 ASSESSMENT — PAIN DESCRIPTION - ORIENTATION: ORIENTATION: MID
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
## 2021-07-12 LAB
GFR AFRICAN AMERICAN: >60
GFR NON-AFRICAN AMERICAN: 50
PERFORMED ON: ABNORMAL
POC CREATININE: 1.1 MG/DL (ref 0.6–1.2)
POC SAMPLE TYPE: ABNORMAL

## 2021-12-23 ENCOUNTER — APPOINTMENT (OUTPATIENT)
Dept: INTERVENTIONAL RADIOLOGY/VASCULAR | Age: 61
End: 2021-12-23
Payer: COMMERCIAL

## 2021-12-23 ENCOUNTER — HOSPITAL ENCOUNTER (EMERGENCY)
Age: 61
Discharge: HOME | End: 2021-12-23
Attending: EMERGENCY MEDICINE
Payer: COMMERCIAL

## 2021-12-23 ENCOUNTER — APPOINTMENT (OUTPATIENT)
Dept: ULTRASOUND IMAGING | Age: 61
End: 2021-12-23
Payer: COMMERCIAL

## 2021-12-23 VITALS
WEIGHT: 205 LBS | BODY MASS INDEX: 32.18 KG/M2 | DIASTOLIC BLOOD PRESSURE: 96 MMHG | OXYGEN SATURATION: 95 % | RESPIRATION RATE: 16 BRPM | HEART RATE: 72 BPM | TEMPERATURE: 97.6 F | SYSTOLIC BLOOD PRESSURE: 138 MMHG | HEIGHT: 67 IN

## 2021-12-23 DIAGNOSIS — Z86.002 HISTORY OF CARCINOMA IN SITU OF VULVA: ICD-10-CM

## 2021-12-23 DIAGNOSIS — Z79.01 CHRONIC ANTICOAGULATION: ICD-10-CM

## 2021-12-23 DIAGNOSIS — I82.A11 ACUTE DEEP VEIN THROMBOSIS (DVT) OF AXILLARY VEIN OF RIGHT UPPER EXTREMITY (HCC): Primary | ICD-10-CM

## 2021-12-23 DIAGNOSIS — F17.200 TOBACCO DEPENDENCE: ICD-10-CM

## 2021-12-23 DIAGNOSIS — Z78.9 FAILURE OF OUTPATIENT TREATMENT: ICD-10-CM

## 2021-12-23 LAB
APTT: 25.7 SEC (ref 24.4–36.8)
BASOPHILS ABSOLUTE: 0 K/UL (ref 0–0.2)
BASOPHILS RELATIVE PERCENT: 0.4 %
EOSINOPHILS ABSOLUTE: 0.1 K/UL (ref 0–0.7)
EOSINOPHILS RELATIVE PERCENT: 1.7 %
HCT VFR BLD CALC: 34.1 % (ref 37–47)
HEMOGLOBIN: 11.1 G/DL (ref 12–16)
INR BLD: 0.9
LYMPHOCYTES ABSOLUTE: 0.7 K/UL (ref 1–4.8)
LYMPHOCYTES RELATIVE PERCENT: 17 %
MCH RBC QN AUTO: 28 PG (ref 27–31.3)
MCHC RBC AUTO-ENTMCNC: 32.5 % (ref 33–37)
MCV RBC AUTO: 86.3 FL (ref 82–100)
MONOCYTES ABSOLUTE: 0.4 K/UL (ref 0.2–0.8)
MONOCYTES RELATIVE PERCENT: 9.1 %
NEUTROPHILS ABSOLUTE: 3.2 K/UL (ref 1.4–6.5)
NEUTROPHILS RELATIVE PERCENT: 71.8 %
PDW BLD-RTO: 15.2 % (ref 11.5–14.5)
PLATELET # BLD: 135 K/UL (ref 130–400)
PROTHROMBIN TIME: 12.6 SEC (ref 12.3–14.9)
RBC # BLD: 3.95 M/UL (ref 4.2–5.4)
TOTAL CK: 48 U/L (ref 0–170)
WBC # BLD: 4.4 K/UL (ref 4.8–10.8)

## 2021-12-23 PROCEDURE — 85025 COMPLETE CBC W/AUTO DIFF WBC: CPT

## 2021-12-23 PROCEDURE — 85610 PROTHROMBIN TIME: CPT

## 2021-12-23 PROCEDURE — 82550 ASSAY OF CK (CPK): CPT

## 2021-12-23 PROCEDURE — 2580000003 HC RX 258: Performed by: STUDENT IN AN ORGANIZED HEALTH CARE EDUCATION/TRAINING PROGRAM

## 2021-12-23 PROCEDURE — 6360000002 HC RX W HCPCS: Performed by: STUDENT IN AN ORGANIZED HEALTH CARE EDUCATION/TRAINING PROGRAM

## 2021-12-23 PROCEDURE — 36573 INSJ PICC RS&I 5 YR+: CPT

## 2021-12-23 PROCEDURE — 2500000003 HC RX 250 WO HCPCS: Performed by: STUDENT IN AN ORGANIZED HEALTH CARE EDUCATION/TRAINING PROGRAM

## 2021-12-23 PROCEDURE — 85730 THROMBOPLASTIN TIME PARTIAL: CPT

## 2021-12-23 PROCEDURE — 96372 THER/PROPH/DIAG INJ SC/IM: CPT

## 2021-12-23 PROCEDURE — 99284 EMERGENCY DEPT VISIT MOD MDM: CPT

## 2021-12-23 PROCEDURE — 2709999900 IR PICC WO SQ PORT/PUMP > 5 YEARS

## 2021-12-23 PROCEDURE — 36415 COLL VENOUS BLD VENIPUNCTURE: CPT

## 2021-12-23 PROCEDURE — 93971 EXTREMITY STUDY: CPT

## 2021-12-23 RX ORDER — SODIUM CHLORIDE 9 MG/ML
250 INJECTION, SOLUTION INTRAVENOUS ONCE
Status: COMPLETED | OUTPATIENT
Start: 2021-12-23 | End: 2021-12-23

## 2021-12-23 RX ORDER — SODIUM CHLORIDE 0.9 % (FLUSH) 0.9 %
5-40 SYRINGE (ML) INJECTION EVERY 12 HOURS SCHEDULED
Status: DISCONTINUED | OUTPATIENT
Start: 2021-12-23 | End: 2021-12-23 | Stop reason: HOSPADM

## 2021-12-23 RX ORDER — LIDOCAINE HYDROCHLORIDE 20 MG/ML
5 INJECTION, SOLUTION INFILTRATION; PERINEURAL ONCE
Status: COMPLETED | OUTPATIENT
Start: 2021-12-23 | End: 2021-12-23

## 2021-12-23 RX ORDER — SODIUM CHLORIDE 0.9 % (FLUSH) 0.9 %
5-40 SYRINGE (ML) INJECTION PRN
Status: DISCONTINUED | OUTPATIENT
Start: 2021-12-23 | End: 2021-12-23 | Stop reason: HOSPADM

## 2021-12-23 RX ORDER — SODIUM CHLORIDE 9 MG/ML
25 INJECTION, SOLUTION INTRAVENOUS PRN
Status: DISCONTINUED | OUTPATIENT
Start: 2021-12-23 | End: 2021-12-23 | Stop reason: HOSPADM

## 2021-12-23 RX ADMIN — LIDOCAINE HYDROCHLORIDE 5 ML: 20 INJECTION, SOLUTION INFILTRATION; PERINEURAL at 12:06

## 2021-12-23 RX ADMIN — SODIUM CHLORIDE 250 ML: 9 INJECTION, SOLUTION INTRAVENOUS at 12:07

## 2021-12-23 RX ADMIN — ENOXAPARIN SODIUM 90 MG: 100 INJECTION SUBCUTANEOUS at 13:21

## 2021-12-23 ASSESSMENT — ENCOUNTER SYMPTOMS
SHORTNESS OF BREATH: 0
VOMITING: 0
BACK PAIN: 0
COUGH: 0
DIARRHEA: 0
SINUS PRESSURE: 0
CHEST TIGHTNESS: 0
ABDOMINAL PAIN: 0
TROUBLE SWALLOWING: 0

## 2021-12-23 ASSESSMENT — PAIN SCALES - GENERAL: PAINLEVEL_OUTOF10: 0

## 2021-12-23 NOTE — ED PROVIDER NOTES
[facility] [facility] ED  eMERGENCY dEPARTMENT eNCOUnter      Pt Name: Vira Roman  MRN: [id number]  [place] 1960  Date of evaluation: 12/23/2021  Provider: Severiano Overton, [address] [place] [address]       Chief Complaint   Patient presents with    Vascular Access Problem     picc line would not flush today         HISTORY OF PRESENT ILLNESS   (Location/Symptom, Timing/Onset,Context/Setting, Quality, Duration, Modifying Factors, Severity)  Note limiting factors. Vira Roman is a 57 y.o. female who presents to the emergency department with c/o not being able to flush picc line. Right upper arm more swollen on physician exam. Patient had just started chemotherapy 3 days ago and is able to move this to her own chemotherapy. She is taking Eliquis. Patient denies any history of blood clots. Patient denies any fever, chills or cough. Patient denies any chest pain. Patient denies vomiting or diarrhea. The history is provided by the patient. NursingNotes were reviewed. REVIEW OF SYSTEMS    (2-9 systems for level 4, 10 or more for level 5)     Review of Systems   Constitutional: Negative for activity change, appetite change, chills, fever and unexpected weight change. HENT: Negative for drooling, ear pain, nosebleeds, sinus pressure and trouble swallowing. Respiratory: Negative for cough, chest tightness and shortness of breath. Cardiovascular: Negative for chest pain and leg swelling. Gastrointestinal: Negative for abdominal pain, diarrhea and vomiting. Endocrine: Negative for polydipsia and polyphagia. Genitourinary: Negative for dysuria, flank pain and frequency. Musculoskeletal: Negative for back pain and myalgias. Skin: Negative for pallor and rash. Neurological: Negative for syncope, weakness and headaches. Hematological: Does not bruise/bleed easily. All other systems reviewed and are negative.       Except as noted above the remainder of the review of systems was reviewed and negative. PAST MEDICAL HISTORY     Past Medical History:   Diagnosis Date    Cancer [facility])          SURGICALHISTORY     History reviewed. No pertinent surgical history. CURRENT MEDICATIONS       Discharge Medication List as of 12/23/2021  2:46 PM      CONTINUE these medications which have NOT CHANGED    Details   naproxen (NAPROSYN) 500 MG tablet Take 1 tablet by mouth 2 times daily for 7 days, Disp-14 tablet, R-0Print      potassium chloride (KLOR-CON) 10 MEQ extended release tablet take 1 tablet if needed for SWELLING, Disp-30 tablet, R-0Normal      furosemide (LASIX) 20 MG tablet Take as needed for swelling, Disp-30 tablet, R-0Print      potassium chloride (KLOR-CON M) 10 MEQ extended release tablet Take as needed for swelling, Disp-30 tablet, R-0Print             ALLERGIES     Amoxicillin and Codeine    FAMILY HISTORY     History reviewed. No pertinent family history. SOCIAL HISTORY       Social History     Socioeconomic History    Marital status:      Spouse name: None    Number of children: None    Years of education: None    Highest education level: None   Occupational History    None   Tobacco Use    Smoking status: Current Every Day Smoker    Smokeless tobacco: Never Used   Substance and Sexual Activity    Alcohol use: No    Drug use: No    Sexual activity: Not Currently   Other Topics Concern    None   Social History Narrative    None     Social Determinants of Health     Financial Resource Strain:     Difficulty of Paying Living Expenses: Not on file   Food Insecurity:     Worried About Running Out of Food in the Last Year: Not on file    Lia of Food in the Last Year: Not on file   Transportation Needs:     Lack of Transportation (Medical): Not on file    Lack of Transportation (Non-Medical):  Not on file   Physical Activity:     Days of Exercise per Week: Not on file    Minutes of Exercise per Session: Not on file   Stress:     Feeling of Stress : Not on file   Social Connections:     Frequency of Communication with Friends and Family: Not on file    Frequency of Social Gatherings with Friends and Family: Not on file    Attends Restoration Services: Not on file    Active Member of Clubs or Organizations: Not on file    Attends Club or Organization Meetings: Not on file    Marital Status: Not on file   Intimate Partner Violence:     Fear of Current or Ex-Partner: Not on file    Emotionally Abused: Not on file    Physically Abused: Not on file    Sexually Abused: Not on file   Housing Stability:     Unable to Pay for Housing in the Last Year: Not on file    Number of [place] in the Last Year: Not on file    Unstable Housing in the Last Year: Not on file       SCREENINGS    Zion Coma Scale  Eye Opening: Spontaneous  Best Verbal Response: Oriented  Best Motor Response: Obeys commands  Zion Coma Scale Score: 15 @FLOW([id number])@      PHYSICAL EXAM    (up to 7 for level 4, 8 or more for level 5)     ED Triage Vitals [12/23/21 1030]   BP Temp Temp Source Pulse Resp SpO2 Height Weight   (!) 178/104 97.6 °F (36.4 °C) Oral 85 16 98 % 5' 7\" (1.702 m) 205 lb (93 kg)       Physical Exam  Vitals and nursing note reviewed. Constitutional:       General: She is awake. She is in acute distress. Appearance: Normal appearance. She is well-developed and normal weight. She is not ill-appearing, toxic-appearing or diaphoretic. Comments: No photophobia. No phonophobia. HENT:      Head: Normocephalic and atraumatic. No Omalley's sign. Right Ear: External ear normal.      Left Ear: External ear normal.      Nose: Nose normal. No congestion or rhinorrhea. Mouth/Throat:      Mouth: Mucous membranes are moist.      Pharynx: Oropharynx is clear. No oropharyngeal exudate or posterior oropharyngeal erythema. Eyes:      General: No scleral icterus. Right eye: No foreign body or discharge. Left eye: No discharge. Extraocular Movements: Extraocular movements intact. Conjunctiva/sclera: Conjunctivae normal.      Left eye: No exudate. Pupils: Pupils are equal, round, and reactive to light. Neck:      Vascular: No JVD. Trachea: No tracheal deviation. Comments: No meningismus. Cardiovascular:      Rate and Rhythm: Normal rate and regular rhythm. Pulses: Normal pulses. Heart sounds: Normal heart sounds. Heart sounds not distant. No murmur heard. No friction rub. No gallop. Pulmonary:      Effort: Pulmonary effort is normal. No respiratory distress. Breath sounds: Normal breath sounds. No stridor. No wheezing, rhonchi or rales. Chest:      Chest wall: No tenderness. Abdominal:      General: Abdomen is flat. Bowel sounds are normal. There is no distension. Palpations: Abdomen is soft. There is no mass. Tenderness: There is no abdominal tenderness. There is no right CVA tenderness, left CVA tenderness, guarding or rebound. Hernia: No hernia is present. Musculoskeletal:         General: Swelling present. No deformity ( Upper extremity) or signs of injury. Normal range of motion. Right upper arm: Swelling and tenderness present. Left upper arm: Normal.        Arms:       Cervical back: Normal range of motion and neck supple. No rigidity. Lymphadenopathy:      Head:      Right side of head: No submental adenopathy. Left side of head: No submental adenopathy. Skin:     General: Skin is warm and dry. Capillary Refill: Capillary refill takes less than 2 seconds. Coloration: Skin is not jaundiced or pale. Findings: No bruising, erythema, lesion or rash. Neurological:      General: No focal deficit present. Mental Status: She is alert and oriented to person, place, and time. Mental status is at baseline. Cranial Nerves: No cranial nerve deficit. Sensory: No sensory deficit. Motor: No weakness.       Coordination: Coordination normal.      Deep Tendon Reflexes: Reflexes are normal and symmetric. Psychiatric:         Mood and Affect: Mood normal.         Behavior: Behavior normal. Behavior is cooperative. Thought Content: Thought content normal.         Judgment: Judgment normal.         DIAGNOSTIC RESULTS     EKG: All EKG's are interpreted by the Emergency Department Physician who either signs or Co-signsthis chart in the absence of a cardiologist.        RADIOLOGY:   Keshia Daubs such as CT, Ultrasound and MRI are read by the radiologist. Plain radiographic images are visualized and preliminarily interpreted by the emergency physician with the below findings:        Interpretation per the Radiologist below, if available at the time ofthis note:    IR PICC WO SQ PORT/PUMP > 5 YEARS   Final Result      US DUP UPPER EXTREMITY RIGHT VENOUS   Final Result   ULTRASOUND FINDINGS ARE POSITIVE FOR NONOCCLUDING THROMBUS SURROUNDING A PORTION OF THE PICC LINE IN THE [address] SUSPECT NONOCCLUDING THROMBUS EXTENDING INTO A BRACHIAL VEIN. ED BEDSIDE ULTRASOUND:   Performed by ED Physician - none    LABS:  Labs Reviewed   CBC WITH AUTO DIFFERENTIAL - Abnormal; Notable for the following components:       Result Value    WBC 4.4 (*)     RBC 3.95 (*)     Hemoglobin 11.1 (*)     Hematocrit 34.1 (*)     MCHC 32.5 (*)     RDW 15.2 (*)     Lymphocytes Absolute 0.7 (*)     All other components within normal limits   CK   PROTIME-INR   APTT   CREATININE, SERUM   PROTIME-INR   APTT   COMPREHENSIVE METABOLIC PANEL       All other labs were within normal range or not returned as of this dictation.     EMERGENCY DEPARTMENT COURSE and DIFFERENTIAL DIAGNOSIS/MDM:   Vitals:    Vitals:    12/23/21 1030 12/23/21 1258   BP: (!) 178/104 (!) 138/96   Pulse: 85 72   Resp: 16 16   Temp: 97.6 °F (36.4 °C)    TempSrc: Oral    SpO2: 98% 95%   Weight: 205 lb (93 kg)    Height: 5' 7\" (1.702 m)            MDM  Labs were obtained and the patient is not thrombocytopenic. Coags appear within normal limits. Patient's last dose of Eliquis was yesterday. The ER physician spoke with Heath Maurice MD at the [facility]. PICC line left in place. Patient started on Lovenox. First dose given in the ER and prescription written for it. Patient that way can follow-up. His office will call today to the patient to arrange follow-up visit. ER physician instructed the patient that if there is any difficulty in filling the prescription such as the pharmacy stating that there is a pre-existing medical condition she is to call her oncologist first and if she is not able to get a hold of them or have that problem dealt with and she is to call the ER and the ER physician would gladly speak to the pharmacist.    Patient failed DOAC and will start Lovenox. Patient vies if any worsening symptoms such as her arm is swelling up more, she, short of breath, she is return to the nearest emergency room. ED attending spoke with GYN oncologist the PICC line will be left in and they will culture the tip at [facility]. She was advised that she is not able to follow-up with any interventional radiology group through the [facility] she can follow-up with the [facility] interventional radiologist about possible clot extraction management. The findings were discussed with the patient. The patient was invited to return  to the ER if worse symptoms. The patient verbalized understanding of the care and they have no further questions. CRITICAL CARE TIME   Total Critical Care time was 36 minutes, excluding separately reportableprocedures. There was a high probability of clinicallysignificant/life threatening deterioration in the patient's condition which required my urgent intervention. CONSULTS:  None    PROCEDURES:  Unless otherwise noted below, none     Procedures    FINAL IMPRESSION      1.  Acute deep vein thrombosis (DVT) of axillary vein of right upper extremity ([address]    2. Failure of outpatient treatment    3. Chronic anticoagulation    4. History of carcinoma in situ of vulva    5.  Tobacco dependence          DISPOSITION/PLAN   DISPOSITION Decision To Discharge 12/23/2021 09:01:34 PM      PATIENT REFERRED TO:  [facility]) ED  [facility] [address]  [address] [address]  [phone]  Go to   If symptoms worsen    MD Amado Olmos [address]  [address] [address] [phone]    Call in 5 days      Murali Young MD  [address]  [facility] [place] [address]  [phone]    Call today        DISCHARGE MEDICATIONS:  Discharge Medication List as of 12/23/2021  2:46 PM      START taking these medications    Details   enoxaparin (LOVENOX) 120 MG/0.8ML injection Inject 0.62 mLs into the skin every 12 hours Patient failed therapy with Eliquis., Disp-37.2 mL, R-0Print                (Please note that portions of this note were completed with a voice recognition program.  Efforts were made to edit the dictations but occasionally words are mis-transcribed.)    Luis Collins DO (electronically signed)  Attending Emergency Physician          Luis Collins DO  12/23/21 8101

## 2021-12-23 NOTE — ED NOTES
Patient returned to room. Mckenna Mcnair  [place], [address] [place] [address]  12/23/21 2480

## 2021-12-23 NOTE — ED TRIAGE NOTES
Pt c/o her PICC line not flushing today, Pt is A&OX3, calm, ambulatory, afebrile, breathes are equal and unlabored,

## 2021-12-24 ENCOUNTER — APPOINTMENT (OUTPATIENT)
Dept: ULTRASOUND IMAGING | Age: 61
End: 2021-12-24
Payer: COMMERCIAL

## 2021-12-24 ENCOUNTER — HOSPITAL ENCOUNTER (EMERGENCY)
Age: 61
Discharge: HOME | End: 2021-12-24
Payer: COMMERCIAL

## 2021-12-24 VITALS
WEIGHT: 205 LBS | RESPIRATION RATE: 17 BRPM | TEMPERATURE: 98.6 F | HEART RATE: 88 BPM | DIASTOLIC BLOOD PRESSURE: 78 MMHG | OXYGEN SATURATION: 98 % | HEIGHT: 67 IN | SYSTOLIC BLOOD PRESSURE: 145 MMHG | BODY MASS INDEX: 32.18 KG/M2

## 2021-12-24 DIAGNOSIS — M79.602 LEFT ARM PAIN: Primary | ICD-10-CM

## 2021-12-24 DIAGNOSIS — U07.1 COVID-19: ICD-10-CM

## 2021-12-24 LAB
ALBUMIN SERPL-MCNC: 3.9 G/DL (ref 3.5–4.6)
ALP BLD-CCNC: 102 U/L (ref 40–130)
ALT SERPL-CCNC: 24 U/L (ref 0–33)
ANION GAP SERPL CALCULATED.3IONS-SCNC: 15 MEQ/L (ref 9–15)
ANISOCYTOSIS: ABNORMAL
APTT: 31.7 SEC (ref 24.4–36.8)
AST SERPL-CCNC: 16 U/L (ref 0–35)
BASOPHILS ABSOLUTE: 0 K/UL (ref 0–0.2)
BASOPHILS RELATIVE PERCENT: 0.2 %
BILIRUB SERPL-MCNC: <0.2 MG/DL (ref 0.2–0.7)
BUN BLDV-MCNC: 17 MG/DL (ref 8–23)
CALCIUM SERPL-MCNC: 8.7 MG/DL (ref 8.5–9.9)
CHLORIDE BLD-SCNC: 104 MEQ/L (ref 95–107)
CO2: 21 MEQ/L (ref 20–31)
CREAT SERPL-MCNC: 1.09 MG/DL (ref 0.5–0.9)
EOSINOPHILS ABSOLUTE: 0 K/UL (ref 0–0.7)
EOSINOPHILS RELATIVE PERCENT: 1 %
GFR AFRICAN AMERICAN: >60
GFR NON-AFRICAN AMERICAN: 50.8
GLOBULIN: 3.3 G/DL (ref 2.3–3.5)
GLUCOSE BLD-MCNC: 161 MG/DL (ref 70–99)
HCT VFR BLD CALC: 36.8 % (ref 37–47)
HEMOGLOBIN: 11.8 G/DL (ref 12–16)
INR BLD: 1
LYMPHOCYTES ABSOLUTE: 0.9 K/UL (ref 1–4.8)
LYMPHOCYTES RELATIVE PERCENT: 27 %
MCH RBC QN AUTO: 27.8 PG (ref 27–31.3)
MCHC RBC AUTO-ENTMCNC: 32 % (ref 33–37)
MCV RBC AUTO: 86.9 FL (ref 82–100)
MONOCYTES ABSOLUTE: 0.4 K/UL (ref 0.2–0.8)
MONOCYTES RELATIVE PERCENT: 11.6 %
NEUTROPHILS ABSOLUTE: 1.9 K/UL (ref 1.4–6.5)
NEUTROPHILS RELATIVE PERCENT: 60 %
NUCLEATED RED BLOOD CELLS: 1 /100 WBC
PDW BLD-RTO: 15.4 % (ref 11.5–14.5)
PLATELET # BLD: 142 K/UL (ref 130–400)
PLATELET SLIDE REVIEW: NORMAL
POTASSIUM SERPL-SCNC: 3.8 MEQ/L (ref 3.4–4.9)
PROTHROMBIN TIME: 12.9 SEC (ref 12.3–14.9)
RBC # BLD: 4.24 M/UL (ref 4.2–5.4)
SARS-COV-2, NAAT: DETECTED
SMUDGE CELLS: 1
SODIUM BLD-SCNC: 140 MEQ/L (ref 135–144)
TOTAL PROTEIN: 7.2 G/DL (ref 6.3–8)
WBC # BLD: 3.2 K/UL (ref 4.8–10.8)

## 2021-12-24 PROCEDURE — 87040 BLOOD CULTURE FOR BACTERIA: CPT

## 2021-12-24 PROCEDURE — 85730 THROMBOPLASTIN TIME PARTIAL: CPT

## 2021-12-24 PROCEDURE — 36415 COLL VENOUS BLD VENIPUNCTURE: CPT

## 2021-12-24 PROCEDURE — 85610 PROTHROMBIN TIME: CPT

## 2021-12-24 PROCEDURE — 80053 COMPREHEN METABOLIC PANEL: CPT

## 2021-12-24 PROCEDURE — 93971 EXTREMITY STUDY: CPT

## 2021-12-24 PROCEDURE — 87635 SARS-COV-2 COVID-19 AMP PRB: CPT

## 2021-12-24 PROCEDURE — 85025 COMPLETE CBC W/AUTO DIFF WBC: CPT

## 2021-12-24 PROCEDURE — 99282 EMERGENCY DEPT VISIT SF MDM: CPT

## 2021-12-24 RX ORDER — IBUPROFEN 600 MG/1
600 TABLET ORAL 3 TIMES DAILY PRN
Qty: 30 TABLET | Refills: 0 | Status: SHIPPED | OUTPATIENT
Start: 2021-12-24

## 2021-12-24 ASSESSMENT — ENCOUNTER SYMPTOMS
SORE THROAT: 0
NAUSEA: 0
BACK PAIN: 0
SHORTNESS OF BREATH: 0
DIARRHEA: 0
CHEST TIGHTNESS: 0
EYE PAIN: 0

## 2021-12-24 ASSESSMENT — PAIN DESCRIPTION - DESCRIPTORS: DESCRIPTORS: ACHING

## 2021-12-24 ASSESSMENT — PAIN DESCRIPTION - ORIENTATION: ORIENTATION: LEFT

## 2021-12-24 ASSESSMENT — PAIN SCALES - GENERAL: PAINLEVEL_OUTOF10: 7

## 2021-12-24 ASSESSMENT — PAIN DESCRIPTION - PAIN TYPE: TYPE: ACUTE PAIN

## 2021-12-24 ASSESSMENT — PAIN DESCRIPTION - FREQUENCY: FREQUENCY: CONTINUOUS

## 2021-12-24 ASSESSMENT — PAIN DESCRIPTION - LOCATION: LOCATION: ARM

## 2021-12-25 NOTE — ED TRIAGE NOTES
Pt reports pain and swelling in left arm s/p picc line placement yesterday. Pt states she has 3 blood clots in her right arm and is currently on Lovenox.

## 2021-12-25 NOTE — ED PROVIDER NOTES
[address] [facility] ED  eMERGENCYdEPARTMENT eNCOUnter      Pt Name: Sal Bryant  MRN: [id number]  [place] 1960  Date of evaluation: 12/24/2021  Provider:Richard Souza PA-C    CHIEF COMPLAINT           HPI  Sal Bryant is a 57 y.o. female presents with left upper arm pain. Pt reports two hours of increased pain and swelling in the left upper arm, sudden onset, severe, sharp pain that radiates to her armpit. She has a history of clots (currently on Lovenox) and has recent pic line placed in that arm for vulvar cancer treatment. Pt denies SOB, chest pain, fever and chills. ROS  Review of Systems   Constitutional: Negative for chills, fatigue and fever. HENT: Negative for ear pain, hearing loss and sore throat. Eyes: Negative for pain and visual disturbance. Respiratory: Negative for chest tightness and shortness of breath. Cardiovascular: Negative for chest pain. Gastrointestinal: Negative for diarrhea and nausea. Endocrine: Negative for cold intolerance. Genitourinary: Negative for hematuria. Musculoskeletal: Negative for back pain. Skin: Negative for rash and wound. +redness and swelling in LUE   Neurological: Negative for dizziness and headaches. Psychiatric/Behavioral: Negative for behavioral problems and confusion. Except as noted above the remainder of the review of systems was reviewed and negative. PAST MEDICAL HISTORY     Past Medical History:   Diagnosis Date    Cancer [facility])          SURGICAL HISTORY     No past surgical history on file.       [address]       Discharge Medication List as of 12/24/2021  9:24 PM      CONTINUE these medications which have NOT CHANGED    Details   enoxaparin (LOVENOX) 120 MG/0.8ML injection Inject 0.62 mLs into the skin every 12 hours Patient failed therapy with Eliquis., Disp-37.2 mL, R-0Print      naproxen (NAPROSYN) 500 MG tablet Take 1 tablet by mouth 2 times daily for 7 days, Disp-14 tablet, R-0Print potassium chloride (KLOR-CON) 10 MEQ extended release tablet take 1 tablet if needed for SWELLING, Disp-30 tablet, R-0Normal      furosemide (LASIX) 20 MG tablet Take as needed for swelling, Disp-30 tablet, R-0Print      potassium chloride (KLOR-CON M) 10 MEQ extended release tablet Take as needed for swelling, Disp-30 tablet, R-0Print             ALLERGIES     Amoxicillin and Codeine    FAMILY HISTORY     No family history on file. SOCIAL HISTORY       Social History     Socioeconomic History    Marital status:      Spouse name: Not on file    Number of children: Not on file    Years of education: Not on file    Highest education level: Not on file   Occupational History    Not on file   Tobacco Use    Smoking status: Current Every Day Smoker    Smokeless tobacco: Never Used   Substance and Sexual Activity    Alcohol use: No    Drug use: No    Sexual activity: Not Currently   Other Topics Concern    Not on file   Social History Narrative    Not on file     Social Determinants of Health     Financial Resource Strain:     Difficulty of Paying Living Expenses: Not on file   Food Insecurity:     Worried About Running Out of Food in the Last Year: Not on file    Lia of Food in the Last Year: Not on file   Transportation Needs:     Lack of Transportation (Medical): Not on file    Lack of Transportation (Non-Medical):  Not on file   Physical Activity:     Days of Exercise per Week: Not on file    Minutes of Exercise per Session: Not on file   Stress:     Feeling of Stress : Not on file   Social Connections:     Frequency of Communication with Friends and Family: Not on file    Frequency of Social Gatherings with Friends and Family: Not on file    Attends Zoroastrianism Services: Not on file    Active Member of Clubs or Organizations: Not on file    Attends Club or Organization Meetings: Not on file    Marital Status: Not on file   Intimate Partner Violence:     Fear of Current or Ex-Partner: Not on file    Emotionally Abused: Not on file    Physically Abused: Not on file    Sexually Abused: Not on file   Housing Stability:     Unable to Pay for Housing in the Last Year: Not on file    Number of Places Lived in the Last Year: Not on file    Unstable Housing in the Last Year: Not on file         PHYSICAL EXAM       ED Triage Vitals [12/24/21 1909]   BP Temp Temp Source Pulse Resp SpO2 Height Weight   (!) 152/94 98.6 °F (37 °C) Temporal 92 16 97 % 5' 7\" (1.702 m) 205 lb (93 kg)       Physical Exam  Constitutional:       Appearance: Normal appearance. HENT:      Head: Normocephalic and atraumatic. Right Ear: External ear normal.      Left Ear: External ear normal.      Nose: Nose normal.      Mouth/Throat:      Mouth: Mucous membranes are moist.   Eyes:      Extraocular Movements: Extraocular movements intact. Conjunctiva/sclera: Conjunctivae normal.   Cardiovascular:      Rate and Rhythm: Normal rate and regular rhythm. Heart sounds: Normal heart sounds. Pulmonary:      Effort: Pulmonary effort is normal.      Breath sounds: Normal breath sounds. No stridor. No wheezing or rhonchi. Abdominal:      Palpations: Abdomen is soft. Tenderness: There is no abdominal tenderness. Musculoskeletal:         General: Normal range of motion. Cervical back: Normal range of motion and neck supple. No tenderness. Skin:     General: Skin is warm and dry. Neurological:      General: No focal deficit present. Mental Status: She is alert and oriented to person, place, and time. Psychiatric:         Mood and Affect: Mood normal.         Behavior: Behavior normal.           MDM  This is a 57year old female presenting with arm pain. Pt is afebrile and HD stable. Pt labs unremarkable, Pt is COVID positive. US for clots is negative. Pt agreeable to discharge and will follow up with her care team. She will return if symptoms change or worsen.            FINAL IMPRESSION      1. Left arm pain    2.  COVID-19          DISPOSITION/PLAN   DISPOSITION Decision To Discharge 12/24/2021 09:59:24 PM        DISCHARGE MEDICATIONS:  [unfilled]         Altaf Ferguson PA-C(electronically signed)  Attending Emergency Physician           Altaf Ferguson PA-C  12/28/21 1400

## 2021-12-28 ENCOUNTER — POST-OP TELEPHONE (OUTPATIENT)
Dept: INTERVENTIONAL RADIOLOGY/VASCULAR | Age: 61
End: 2021-12-28

## 2021-12-28 NOTE — PROGRESS NOTES
55  Spoke with pt regarding her existing left PICC line that was placed on 12/23/2021. Pt is concerned about the dressing needing changed and having a blood clot at the external site. Pt is currently here at the hospital and wants the dressing changed. She is Covid positive without any symptoms at this time. 21 [phone] Pt was brought to CT HR (gait steady), left PICC line accessed, dressing changed, line flushed with excellent blood return. Pt was very appreciate. She is currently receiving chemo treatments though the [facility] and they are on hold until 1/3/2022 due to her Covid diagnosis. All precautions were taken during the care of this pt.     1050 Pt was walked back to the ER exit at this time.

## 2021-12-29 LAB
BLOOD CULTURE, ROUTINE: NORMAL
CULTURE, BLOOD 2: NORMAL

## 2021-12-31 ENCOUNTER — HOSPITAL ENCOUNTER (EMERGENCY)
Age: 61
Discharge: LEFT BEFORE DISCHARGE | End: 2021-12-31

## 2022-05-24 ENCOUNTER — HOSPITAL ENCOUNTER (EMERGENCY)
Age: 62
Discharge: HOME | End: 2022-05-24
Payer: COMMERCIAL

## 2022-05-24 VITALS
RESPIRATION RATE: 18 BRPM | BODY MASS INDEX: 32.18 KG/M2 | TEMPERATURE: 97.5 F | SYSTOLIC BLOOD PRESSURE: 134 MMHG | DIASTOLIC BLOOD PRESSURE: 85 MMHG | HEART RATE: 80 BPM | OXYGEN SATURATION: 99 % | HEIGHT: 67 IN | WEIGHT: 205 LBS

## 2022-05-24 DIAGNOSIS — M62.838 TRAPEZIUS MUSCLE SPASM: Primary | ICD-10-CM

## 2022-05-24 PROCEDURE — 6360000002 HC RX W HCPCS: Performed by: PHYSICIAN ASSISTANT

## 2022-05-24 PROCEDURE — 99284 EMERGENCY DEPT VISIT MOD MDM: CPT

## 2022-05-24 PROCEDURE — 96372 THER/PROPH/DIAG INJ SC/IM: CPT

## 2022-05-24 RX ORDER — KETOROLAC TROMETHAMINE 30 MG/ML
30 INJECTION, SOLUTION INTRAMUSCULAR; INTRAVENOUS ONCE
Status: COMPLETED | OUTPATIENT
Start: 2022-05-24 | End: 2022-05-24

## 2022-05-24 RX ORDER — NAPROXEN 500 MG/1
500 TABLET ORAL 2 TIMES DAILY
Qty: 14 TABLET | Refills: 0 | Status: SHIPPED | OUTPATIENT
Start: 2022-05-24 | End: 2022-05-31

## 2022-05-24 RX ORDER — CYCLOBENZAPRINE HCL 10 MG
10 TABLET ORAL 3 TIMES DAILY PRN
Qty: 21 TABLET | Refills: 0 | Status: SHIPPED | OUTPATIENT
Start: 2022-05-24 | End: 2022-05-31

## 2022-05-24 RX ADMIN — KETOROLAC TROMETHAMINE 30 MG: 30 INJECTION, SOLUTION INTRAMUSCULAR; INTRAVENOUS at 06:16

## 2022-05-24 ASSESSMENT — ENCOUNTER SYMPTOMS
SHORTNESS OF BREATH: 0
ABDOMINAL DISTENTION: 0
EYE DISCHARGE: 0
COLOR CHANGE: 0
RHINORRHEA: 0
SORE THROAT: 0
ABDOMINAL PAIN: 0
CONSTIPATION: 0

## 2022-05-24 ASSESSMENT — PAIN DESCRIPTION - ORIENTATION: ORIENTATION: RIGHT;UPPER

## 2022-05-24 ASSESSMENT — PAIN DESCRIPTION - LOCATION: LOCATION: BACK

## 2022-05-24 ASSESSMENT — PAIN - FUNCTIONAL ASSESSMENT: PAIN_FUNCTIONAL_ASSESSMENT: 0-10

## 2022-05-24 ASSESSMENT — LIFESTYLE VARIABLES: HOW OFTEN DO YOU HAVE A DRINK CONTAINING ALCOHOL: NEVER

## 2022-05-24 ASSESSMENT — PAIN DESCRIPTION - PAIN TYPE: TYPE: ACUTE PAIN

## 2022-05-24 ASSESSMENT — PAIN DESCRIPTION - DESCRIPTORS: DESCRIPTORS: SHARP

## 2022-05-24 ASSESSMENT — PAIN SCALES - GENERAL: PAINLEVEL_OUTOF10: 10

## 2022-05-24 NOTE — ED TRIAGE NOTES
Presents to Ed for a complaint of rt upper back pain with pain radiating down the rt arm. States the pain has been ongoing since Saturday. Denies any injury.

## 2022-05-24 NOTE — ED NOTES
Discharge instructions reviewed with patient. Verbalized understanding. Skin p/w/d. Respirations even and unlabored. No acute distress noted at this time.         Dean Gutierrez RN  05/24/22 4009

## 2022-05-24 NOTE — ED PROVIDER NOTES
[facility] ED  eMERGENCY dEPARTMENT eNCOUnter      Pt Name: Adolfo Reilly  MRN: [id number]  [place] 1960  Date of evaluation: 5/24/2022  Provider: Bg Marshall PA-C    CHIEF COMPLAINT       Chief Complaint   Patient presents with    Back Pain         HISTORY OF PRESENT ILLNESS   (Location/Symptom, Timing/Onset,Context/Setting, Quality, Duration, Modifying Factors, Severity)  Note limiting factors. Adolfo Reilly is a 57 y.o. female who presents to the emergency department with complaint of right upper trapezius muscle spasm, which patient states started yesterday for her, she states she had done some lifting, pulling, began having some spasms in her back, she states she has had this previously, she has no midline tenderness, no numbness or tingling to arms or legs, no difficulty with ambulation, no shortness of breath or cough. She denies any fevers or specific acute injury. She rates her current pain at this time is a 10 out of 10 she has not taken anything at home for pain control. Past medical history significant for cervical cancer. HPI    NursingNotes were reviewed. REVIEW OF SYSTEMS    (2-9 systems for level 4, 10 or more for level 5)     Review of Systems   Constitutional: Negative for activity change and appetite change. HENT: Negative for congestion, ear discharge, ear pain, nosebleeds, rhinorrhea and sore throat. Eyes: Negative for discharge. Respiratory: Negative for shortness of breath. Cardiovascular: Negative for chest pain, palpitations and leg swelling. Gastrointestinal: Negative for abdominal distention, abdominal pain and constipation. Genitourinary: Negative for difficulty urinating and dysuria. Musculoskeletal: Negative for arthralgias. Muscle spasms in the right upper trapezius. Skin: Negative for color change. Neurological: Negative for dizziness, syncope, numbness and headaches.    Psychiatric/Behavioral: Negative for agitation and confusion. Except as noted above the remainder of the review of systems was reviewed and negative. PAST MEDICAL HISTORY     Past Medical History:   Diagnosis Date    Cancer ([facility] [address]          SURGICALHISTORY       Past Surgical History:   Procedure Laterality Date    VAGINA SURGERY           CURRENT MEDICATIONS       Previous Medications    FUROSEMIDE (LASIX) 20 MG TABLET    Take as needed for swelling    IBUPROFEN (ADVIL;MOTRIN) 600 MG TABLET    Take 1 tablet by mouth 3 times daily as needed for Pain    POTASSIUM CHLORIDE (KLOR-CON M) 10 MEQ EXTENDED RELEASE TABLET    Take as needed for swelling    POTASSIUM CHLORIDE (KLOR-CON) 10 MEQ EXTENDED RELEASE TABLET    take 1 tablet if needed for SWELLING       ALLERGIES     Amoxicillin, Codeine, and Adhesive tape    FAMILY HISTORY     History reviewed. No pertinent family history. SOCIAL HISTORY       Social History     Socioeconomic History    Marital status:      Spouse name: None    Number of children: None    Years of education: None    Highest education level: None   Occupational History    None   Tobacco Use    Smoking status: Current Every Day Smoker    Smokeless tobacco: Never Used   Substance and Sexual Activity    Alcohol use: No    Drug use: Yes     Types: Marijuana [place])    Sexual activity: Not Currently   Other Topics Concern    None   Social History Narrative    None     Social Determinants of Health     Financial Resource Strain:     Difficulty of Paying Living Expenses: Not on file   Food Insecurity:     Worried About Running Out of Food in the Last Year: Not on file    Lia of Food in the Last Year: Not on file   Transportation Needs:     Lack of Transportation (Medical): Not on file    Lack of Transportation (Non-Medical):  Not on file   Physical Activity:     Days of Exercise per Week: Not on file    Minutes of Exercise per Session: Not on file   Stress:     Feeling of Stress : Not on file   Social Connections:     Frequency of Communication with Friends and Family: Not on file    Frequency of Social Gatherings with Friends and Family: Not on file    Attends Jainism Services: Not on file    Active Member of Clubs or Organizations: Not on file    Attends Club or Organization Meetings: Not on file    Marital Status: Not on file   Intimate Partner Violence:     Fear of Current or Ex-Partner: Not on file    Emotionally Abused: Not on file    Physically Abused: Not on file    Sexually Abused: Not on file   Housing Stability:     Unable to Pay for Housing in the Last Year: Not on file    Number of [place] in the Last Year: Not on file    Unstable Housing in the Last Year: Not on file       SCREENINGS    Zion Coma Scale  Eye Opening: Spontaneous  Best Verbal Response: Oriented  Best Motor Response: Obeys commands  Randolph Coma Scale Score: 15 @FLOW(31988527)@      PHYSICAL EXAM    (up to 7 for level 4, 8 or more for level 5)     ED Triage Vitals   BP Temp Temp Source Pulse Resp SpO2 Height Weight   05/24/22 0558 05/24/22 0554 05/24/22 0554 05/24/22 0554 05/24/22 0554 05/24/22 0554 05/24/22 0554 05/24/22 0554   134/85 97.5 °F (36.4 °C) Oral 80 18 99 % 5' 7\" (1.702 m) 205 lb (93 kg)       Physical Exam  Vitals and nursing note reviewed. Constitutional:       General: She is not in acute distress. Appearance: She is well-developed. She is not ill-appearing, toxic-appearing or diaphoretic. HENT:      Head: Normocephalic. Nose: No congestion. Mouth/Throat:      Mouth: Mucous membranes are moist.      Pharynx: No oropharyngeal exudate or posterior oropharyngeal erythema. Eyes:      Extraocular Movements: Extraocular movements intact. Conjunctiva/sclera: Conjunctivae normal.      Pupils: Pupils are equal, round, and reactive to light. Neck:      Vascular: No JVD. Trachea: No tracheal deviation. Cardiovascular:      Rate and Rhythm: Normal rate.       Pulses: Normal pulses. Heart sounds: Normal heart sounds. No murmur heard. No friction rub. No gallop. Pulmonary:      Effort: Pulmonary effort is normal. No tachypnea, accessory muscle usage, respiratory distress or retractions. Breath sounds: No stridor. No wheezing, rhonchi or rales. Chest:      Chest wall: No tenderness. Abdominal:      General: Abdomen is flat. Bowel sounds are normal. There is no distension or abdominal bruit. Palpations: There is no shifting dullness, fluid wave, hepatomegaly, splenomegaly, mass or pulsatile mass. Tenderness: There is no abdominal tenderness. There is no right CVA tenderness, left CVA tenderness, guarding or rebound. Negative signs include Nunez's sign, Rovsing's sign and McBurney's sign. Musculoskeletal:         General: No deformity. Cervical back: Normal range of motion and neck supple. No rigidity. Comments: Patient has mild tenderness on palpation to right upper trapezius muscle, increased pain with movement of right upper extremity. No cut scrapes abrasions or rashes are noted. Pain is reproducible by palpation and movement. Good sensation of right upper extremity, radial pulses are present, upper extremities are neurovascular intact. No midline tenderness to spine. No step-offs or crepitus. Skin:     General: Skin is warm and dry. Capillary Refill: Capillary refill takes less than 2 seconds. Coloration: Skin is not jaundiced. Neurological:      General: No focal deficit present. Mental Status: She is alert and oriented to person, place, and time. Mental status is at baseline. Cranial Nerves: No cranial nerve deficit. Sensory: No sensory deficit. Motor: No weakness.       Coordination: Coordination normal.   Psychiatric:         Mood and Affect: Mood normal.         DIAGNOSTIC RESULTS     EKG: All EKG's are interpreted by the Emergency Department Physician who either signs or Co-signsthis chart in the absence of a cardiologist.        RADIOLOGY:   Richard  such as CT, Ultrasound and MRI are read by the radiologist. Plain radiographic images are visualized and preliminarily interpreted by the emergency physician with the below findings:        Interpretation per the Radiologist below, if available at the time ofthis note:    No orders to display         ED BEDSIDE ULTRASOUND:   Performed by ED Physician - none    LABS:  Labs Reviewed - No data to display    All other labs were within normal range or not returned as of this dictation. EMERGENCY DEPARTMENT COURSE and DIFFERENTIAL DIAGNOSIS/MDM:   Vitals:    Vitals:    22 0554 22 0558   BP:  134/85   Pulse: 80    Resp: 18    Temp: 97.5 °F (36.4 °C)    TempSrc: Oral    SpO2: 99%    Weight: 205 lb (93 kg)    Height: 5' 7\" (1.702 m)           MDM  Number of Diagnoses or Management Options  Trapezius muscle spasm  Diagnosis management comments: Patient presents to ED with complaint of right upper trapezius pain which started yesterday for her, she states she was at a , she states she was doing a lot of lifting and moving at the , she began having spasms in her upper back, she states she has had this before in the past, she denies any specific acute injury. There is no numbness or tingling, no bowel or bladder dysfunction, no difficulty with ambulation, no weakness to upper extremities. Patient does have increased pain with movement or by palpation, she was given a shot of Toradol in emerge department, sent home with a prescription for Naprosyn Flexeril, she was advised to contact her regular family provider for follow-up, should she have any worsening or change in condition, she was advised return to ED. CRITICAL CARE TIME   Total Critical Care time was 0 minutes, excluding separately reportableprocedures.   There was a high probability of clinicallysignificant/life threatening deterioration in the patient's condition which required my urgent intervention. CONSULTS:  None    PROCEDURES:  Unless otherwise noted below, none     Procedures    FINAL IMPRESSION      1.  Trapezius muscle spasm          DISPOSITION/PLAN   DISPOSITION Discharge - Pending Orders Complete 05/24/2022 06:12:12 AM      PATIENT REFERRED TO:  MD Vin Cramer [id number]  [phone]    In 3 days        DISCHARGE MEDICATIONS:  New Prescriptions    CYCLOBENZAPRINE (FLEXERIL) 10 MG TABLET    Take 1 tablet by mouth 3 times daily as needed for Muscle spasms    NAPROXEN (NAPROSYN) 500 MG TABLET    Take 1 tablet by mouth 2 times daily for 7 days          (Please note that portions of this note were completed with a voice recognition program.  Efforts were made to edit the dictations but occasionally words are mis-transcribed.)    Volodymyr Dominique PA-C (electronically signed)  Attending Emergency Physician         Volodymyr Dominique PA-C  05/24/22 [id number]

## 2023-03-27 RX ORDER — APIXABAN 2.5 MG/1
TABLET, FILM COATED ORAL
Qty: 180 TABLET | OUTPATIENT
Start: 2023-03-27

## 2023-04-24 ENCOUNTER — HOSPITAL ENCOUNTER (EMERGENCY)
Age: 63
Discharge: HOME | End: 2023-04-24
Attending: STUDENT IN AN ORGANIZED HEALTH CARE EDUCATION/TRAINING PROGRAM
Payer: COMMERCIAL

## 2023-04-24 VITALS
HEART RATE: 78 BPM | TEMPERATURE: 97.8 F | SYSTOLIC BLOOD PRESSURE: 126 MMHG | HEIGHT: 67 IN | OXYGEN SATURATION: 100 % | DIASTOLIC BLOOD PRESSURE: 79 MMHG | RESPIRATION RATE: 20 BRPM | WEIGHT: 210 LBS | BODY MASS INDEX: 32.96 KG/M2

## 2023-04-24 DIAGNOSIS — M62.838 SPASM OF MUSCLE: ICD-10-CM

## 2023-04-24 DIAGNOSIS — S29.019A THORACIC MYOFASCIAL STRAIN, INITIAL ENCOUNTER: Primary | ICD-10-CM

## 2023-04-24 PROCEDURE — 99284 EMERGENCY DEPT VISIT MOD MDM: CPT

## 2023-04-24 PROCEDURE — 6370000000 HC RX 637 (ALT 250 FOR IP): Performed by: STUDENT IN AN ORGANIZED HEALTH CARE EDUCATION/TRAINING PROGRAM

## 2023-04-24 PROCEDURE — 96372 THER/PROPH/DIAG INJ SC/IM: CPT

## 2023-04-24 PROCEDURE — 6360000002 HC RX W HCPCS: Performed by: STUDENT IN AN ORGANIZED HEALTH CARE EDUCATION/TRAINING PROGRAM

## 2023-04-24 RX ORDER — DEXAMETHASONE 6 MG/1
12 TABLET ORAL ONCE
Status: COMPLETED | OUTPATIENT
Start: 2023-04-24 | End: 2023-04-24

## 2023-04-24 RX ORDER — ACETAMINOPHEN 500 MG
1000 TABLET ORAL EVERY 6 HOURS PRN
Qty: 60 TABLET | Refills: 0 | Status: SHIPPED | OUTPATIENT
Start: 2023-04-24

## 2023-04-24 RX ORDER — METHOCARBAMOL 500 MG/1
1500 TABLET, FILM COATED ORAL ONCE
Status: COMPLETED | OUTPATIENT
Start: 2023-04-24 | End: 2023-04-24

## 2023-04-24 RX ORDER — METHOCARBAMOL 500 MG/1
1000 TABLET, FILM COATED ORAL 4 TIMES DAILY PRN
Qty: 40 TABLET | Refills: 0 | Status: SHIPPED | OUTPATIENT
Start: 2023-04-24 | End: 2023-05-01

## 2023-04-24 RX ORDER — IBUPROFEN 400 MG/1
400 TABLET ORAL EVERY 6 HOURS PRN
Qty: 120 TABLET | Refills: 0 | Status: SHIPPED | OUTPATIENT
Start: 2023-04-24

## 2023-04-24 RX ORDER — KETOROLAC TROMETHAMINE 30 MG/ML
30 INJECTION, SOLUTION INTRAMUSCULAR; INTRAVENOUS ONCE
Status: COMPLETED | OUTPATIENT
Start: 2023-04-24 | End: 2023-04-24

## 2023-04-24 RX ORDER — OXYCODONE HYDROCHLORIDE 5 MG/1
5 TABLET ORAL ONCE
Status: COMPLETED | OUTPATIENT
Start: 2023-04-24 | End: 2023-04-24

## 2023-04-24 RX ORDER — ACETAMINOPHEN 500 MG
1000 TABLET ORAL ONCE
Status: COMPLETED | OUTPATIENT
Start: 2023-04-24 | End: 2023-04-24

## 2023-04-24 RX ADMIN — OXYCODONE 5 MG: 5 TABLET ORAL at 19:21

## 2023-04-24 RX ADMIN — KETOROLAC TROMETHAMINE 30 MG: 30 INJECTION, SOLUTION INTRAMUSCULAR; INTRAVENOUS at 19:22

## 2023-04-24 RX ADMIN — DEXAMETHASONE 12 MG: 6 TABLET ORAL at 19:22

## 2023-04-24 RX ADMIN — ACETAMINOPHEN 1000 MG: 500 TABLET ORAL at 19:21

## 2023-04-24 RX ADMIN — METHOCARBAMOL 1500 MG: 500 TABLET ORAL at 19:22

## 2023-04-24 ASSESSMENT — PAIN DESCRIPTION - ORIENTATION
ORIENTATION: RIGHT;UPPER
ORIENTATION: RIGHT;UPPER

## 2023-04-24 ASSESSMENT — PAIN DESCRIPTION - LOCATION
LOCATION: BACK
LOCATION: BACK

## 2023-04-24 ASSESSMENT — PAIN DESCRIPTION - PAIN TYPE: TYPE: ACUTE PAIN

## 2023-04-24 ASSESSMENT — PAIN SCALES - GENERAL
PAINLEVEL_OUTOF10: 10
PAINLEVEL_OUTOF10: 10

## 2023-04-24 ASSESSMENT — LIFESTYLE VARIABLES
HOW OFTEN DO YOU HAVE A DRINK CONTAINING ALCOHOL: NEVER
HOW MANY STANDARD DRINKS CONTAINING ALCOHOL DO YOU HAVE ON A TYPICAL DAY: PATIENT DOES NOT DRINK

## 2023-04-24 ASSESSMENT — PAIN DESCRIPTION - DESCRIPTORS: DESCRIPTORS: ACHING

## 2023-04-24 ASSESSMENT — PAIN - FUNCTIONAL ASSESSMENT: PAIN_FUNCTIONAL_ASSESSMENT: 0-10

## 2023-05-08 ASSESSMENT — ENCOUNTER SYMPTOMS: BACK PAIN: 1

## 2023-05-08 NOTE — ED PROVIDER NOTES
[address] [facility] ED  eMERGENCY dEPARTMENT eNCOUnter      Pt Name: Leia Darnell  MRN: [id number]  [place] 1960  Date of evaluation: 4/24/2023  Provider: Tete Trevino MD      HISTORY OF PRESENT ILLNESS      Chief Complaint   Patient presents with    Back Pain     Right upper, spasms since this am       The history is provided by the Patient. Leia Darnell is a 60 y.o. female with a PMH clinically significant for CAD, Obesity, DVT/PE, OA, Anemia, and Tobacco Smoking presenting to the ED via PV c/o R-sided upper back pain with initial onset this AM with associated spasms. Unsure if she might have slept on it wrong. C/o pain that is constant, aching. Worse with movement and palpation. Not particularly worse with exertion or deep inspiration. Denies any associated: F/C/D, HA, Neck pain/stiffness, Lightheadedness, Dizziness, Numbness, Focal Weakness, Cough, Hemoptysis, SOB, CP, VERA, New or worsening BLE Edema/pain, Abd pain, or N/V/D/C. States they have otherwise been feeling well. Per Chart Review: PMH as noted above obtained via outpatient chart review. REVIEW OF SYSTEMS       Review of Systems   Musculoskeletal:  Positive for back pain and myalgias. Negative for arthralgias, gait problem and neck stiffness. All other systems reviewed and are negative. PAST MEDICAL HISTORY     Past Medical History:   Diagnosis Date    Cancer [facility])        SURGICAL HISTORY       Past Surgical History:   Procedure Laterality Date    VAGINA SURGERY         FAMILY HISTORY     No family history on file.     SOCIAL HISTORY       Social History     Socioeconomic History    Marital status:    Tobacco Use    Smoking status: Every Day    Smokeless tobacco: Never   Substance and Sexual Activity    Alcohol use: No    Drug use: Yes     Types: Marijuana [place])    Sexual activity: Not Currently       CURRENT MEDICATIONS       Discharge Medication List as of 4/24/2023  7:35 PM        CONTINUE these medications

## 2024-01-29 ENCOUNTER — APPOINTMENT (OUTPATIENT)
Dept: PRIMARY CARE | Facility: CLINIC | Age: 64
End: 2024-01-29
Payer: COMMERCIAL

## 2024-02-21 ENCOUNTER — APPOINTMENT (OUTPATIENT)
Dept: PRIMARY CARE | Facility: CLINIC | Age: 64
End: 2024-02-21
Payer: COMMERCIAL

## 2024-05-13 ENCOUNTER — OFFICE VISIT (OUTPATIENT)
Dept: PRIMARY CARE CLINIC | Age: 64
End: 2024-05-13

## 2024-05-13 VITALS
HEIGHT: 67 IN | BODY MASS INDEX: 33.43 KG/M2 | HEART RATE: 68 BPM | DIASTOLIC BLOOD PRESSURE: 86 MMHG | SYSTOLIC BLOOD PRESSURE: 132 MMHG | OXYGEN SATURATION: 98 % | WEIGHT: 213 LBS

## 2024-05-13 DIAGNOSIS — M25.471 EDEMA OF BOTH ANKLES: Primary | ICD-10-CM

## 2024-05-13 DIAGNOSIS — R05.3 CHRONIC COUGH: ICD-10-CM

## 2024-05-13 DIAGNOSIS — E78.49 OTHER HYPERLIPIDEMIA: ICD-10-CM

## 2024-05-13 DIAGNOSIS — R73.9 HYPERGLYCEMIA: ICD-10-CM

## 2024-05-13 DIAGNOSIS — M25.472 EDEMA OF BOTH ANKLES: Primary | ICD-10-CM

## 2024-05-13 DIAGNOSIS — Z00.00 PREVENTATIVE HEALTH CARE: ICD-10-CM

## 2024-05-13 DIAGNOSIS — E03.9 HYPOTHYROIDISM, UNSPECIFIED TYPE: ICD-10-CM

## 2024-05-13 RX ORDER — TRIAMTERENE AND HYDROCHLOROTHIAZIDE 37.5; 25 MG/1; MG/1
1 TABLET ORAL DAILY
Qty: 30 TABLET | Refills: 5 | Status: SHIPPED | OUTPATIENT
Start: 2024-05-13

## 2024-05-13 SDOH — HEALTH STABILITY: PHYSICAL HEALTH: ON AVERAGE, HOW MANY DAYS PER WEEK DO YOU ENGAGE IN MODERATE TO STRENUOUS EXERCISE (LIKE A BRISK WALK)?: 2 DAYS

## 2024-05-13 SDOH — HEALTH STABILITY: PHYSICAL HEALTH: ON AVERAGE, HOW MANY MINUTES DO YOU ENGAGE IN EXERCISE AT THIS LEVEL?: 30 MIN

## 2024-05-13 SDOH — ECONOMIC STABILITY: FOOD INSECURITY: WITHIN THE PAST 12 MONTHS, YOU WORRIED THAT YOUR FOOD WOULD RUN OUT BEFORE YOU GOT MONEY TO BUY MORE.: NEVER TRUE

## 2024-05-13 SDOH — ECONOMIC STABILITY: FOOD INSECURITY: WITHIN THE PAST 12 MONTHS, THE FOOD YOU BOUGHT JUST DIDN'T LAST AND YOU DIDN'T HAVE MONEY TO GET MORE.: NEVER TRUE

## 2024-05-13 SDOH — ECONOMIC STABILITY: HOUSING INSECURITY
IN THE LAST 12 MONTHS, WAS THERE A TIME WHEN YOU DID NOT HAVE A STEADY PLACE TO SLEEP OR SLEPT IN A SHELTER (INCLUDING NOW)?: NO

## 2024-05-13 SDOH — ECONOMIC STABILITY: INCOME INSECURITY: HOW HARD IS IT FOR YOU TO PAY FOR THE VERY BASICS LIKE FOOD, HOUSING, MEDICAL CARE, AND HEATING?: NOT HARD AT ALL

## 2024-05-13 ASSESSMENT — PATIENT HEALTH QUESTIONNAIRE - PHQ9
SUM OF ALL RESPONSES TO PHQ QUESTIONS 1-9: 0
1. LITTLE INTEREST OR PLEASURE IN DOING THINGS: NOT AT ALL
SUM OF ALL RESPONSES TO PHQ9 QUESTIONS 1 & 2: 0
2. FEELING DOWN, DEPRESSED OR HOPELESS: NOT AT ALL

## 2024-05-13 NOTE — PROGRESS NOTES
Katy Ponce 63 y.o. female presents today with   Chief Complaint   Patient presents with    New Patient       Cough  This is a recurrent problem. The current episode started more than 1 year ago. The problem has been waxing and waning. The problem occurs every few hours. The cough is Non-productive. Pertinent negatives include no chest pain, chills, fever, rash or rhinorrhea.       Past Medical History:   Diagnosis Date    Alopecia hereditaria     Cancer (HCC)     volva    Colon polyps 03/2024    DVT, lower extremity (HCC)     Lymphedema     Smoker      There are no problems to display for this patient.    Past Surgical History:   Procedure Laterality Date    VAGINA SURGERY       No family history on file.  Social History     Socioeconomic History    Marital status:      Spouse name: None    Number of children: None    Years of education: None    Highest education level: None   Tobacco Use    Smoking status: Every Day    Smokeless tobacco: Never   Substance and Sexual Activity    Alcohol use: No    Drug use: Yes     Types: Marijuana ([place])    Sexual activity: Not Currently     Social Determinants of Health     Financial Resource Strain: Low Risk  (5/13/2024)    Overall Financial Resource Strain (CARDIA)     Difficulty of Paying Living Expenses: Not hard at all   Food Insecurity: No Food Insecurity (5/13/2024)    Hunger Vital Sign     Worried About Running Out of Food in the Last Year: Never true     Ran Out of Food in the Last Year: Never true   Transportation Needs: Unknown (5/13/2024)    PRAPARE - Transportation     Lack of Transportation (Non-Medical): No   Physical Activity: Insufficiently Active (5/13/2024)    Exercise Vital Sign     Days of Exercise per Week: 2 days     Minutes of Exercise per Session: 30 min   Housing Stability: Unknown (5/13/2024)    Housing Stability Vital Sign     Unstable Housing in the Last Year: No     Allergies   Allergen Reactions    Amoxicillin     Codeine

## 2024-05-14 DIAGNOSIS — E03.9 HYPOTHYROIDISM, UNSPECIFIED TYPE: ICD-10-CM

## 2024-05-14 DIAGNOSIS — Z00.00 PREVENTATIVE HEALTH CARE: ICD-10-CM

## 2024-05-14 DIAGNOSIS — R73.9 HYPERGLYCEMIA: ICD-10-CM

## 2024-05-14 DIAGNOSIS — E78.49 OTHER HYPERLIPIDEMIA: ICD-10-CM

## 2024-05-14 LAB
ALBUMIN SERPL-MCNC: 4 G/DL (ref 3.5–4.6)
ALP SERPL-CCNC: 123 U/L (ref 40–130)
ALT SERPL-CCNC: 13 U/L (ref 0–33)
ANION GAP SERPL CALCULATED.3IONS-SCNC: 10 MEQ/L (ref 9–15)
AST SERPL-CCNC: 19 U/L (ref 0–35)
BASOPHILS # BLD: 0 K/UL (ref 0–0.2)
BASOPHILS NFR BLD: 0.7 %
BILIRUB SERPL-MCNC: 0.3 MG/DL (ref 0.2–0.7)
BUN SERPL-MCNC: 19 MG/DL (ref 8–23)
CALCIUM SERPL-MCNC: 9.4 MG/DL (ref 8.5–9.9)
CHLORIDE SERPL-SCNC: 103 MEQ/L (ref 95–107)
CHOLEST SERPL-MCNC: 240 MG/DL (ref 0–199)
CO2 SERPL-SCNC: 27 MEQ/L (ref 20–31)
CREAT SERPL-MCNC: 1.12 MG/DL (ref 0.5–0.9)
EOSINOPHIL # BLD: 0.2 K/UL (ref 0–0.7)
EOSINOPHIL NFR BLD: 2.6 %
ERYTHROCYTE [DISTWIDTH] IN BLOOD BY AUTOMATED COUNT: 15.8 % (ref 11.5–14.5)
GLOBULIN SER CALC-MCNC: 3.2 G/DL (ref 2.3–3.5)
GLUCOSE SERPL-MCNC: 98 MG/DL (ref 70–99)
HCT VFR BLD AUTO: 39 % (ref 37–47)
HDLC SERPL-MCNC: 71 MG/DL (ref 40–59)
HGB BLD-MCNC: 12.6 G/DL (ref 12–16)
LDLC SERPL CALC-MCNC: 156 MG/DL (ref 0–129)
LYMPHOCYTES # BLD: 1.5 K/UL (ref 1–4.8)
LYMPHOCYTES NFR BLD: 25.3 %
MCH RBC QN AUTO: 28.4 PG (ref 27–31.3)
MCHC RBC AUTO-ENTMCNC: 32.3 % (ref 33–37)
MCV RBC AUTO: 87.8 FL (ref 79.4–94.8)
MONOCYTES # BLD: 0.6 K/UL (ref 0.2–0.8)
MONOCYTES NFR BLD: 10.4 %
NEUTROPHILS # BLD: 3.5 K/UL (ref 1.4–6.5)
NEUTS SEG NFR BLD: 60.3 %
PLATELET # BLD AUTO: 255 K/UL (ref 130–400)
POTASSIUM SERPL-SCNC: 4.2 MEQ/L (ref 3.4–4.9)
PROT SERPL-MCNC: 7.2 G/DL (ref 6.3–8)
RBC # BLD AUTO: 4.44 M/UL (ref 4.2–5.4)
SODIUM SERPL-SCNC: 140 MEQ/L (ref 135–144)
TRIGL SERPL-MCNC: 64 MG/DL (ref 0–150)
TSH REFLEX: 1.92 UIU/ML (ref 0.44–3.86)
WBC # BLD AUTO: 5.9 K/UL (ref 4.8–10.8)

## 2024-05-15 LAB
ESTIMATED AVERAGE GLUCOSE: 123 MG/DL
HBA1C MFR BLD: 5.9 % (ref 4–6)

## 2024-05-17 DIAGNOSIS — E78.2 MIXED HYPERLIPIDEMIA: Primary | ICD-10-CM

## 2024-05-17 RX ORDER — ATORVASTATIN CALCIUM 10 MG/1
10 TABLET, FILM COATED ORAL DAILY
Qty: 30 TABLET | Refills: 5 | Status: SHIPPED | OUTPATIENT
Start: 2024-05-17

## 2024-05-18 ASSESSMENT — ENCOUNTER SYMPTOMS
ABDOMINAL DISTENTION: 0
FACIAL SWELLING: 0
APNEA: 0
RHINORRHEA: 0
PHOTOPHOBIA: 0
BLOOD IN STOOL: 0
CHOKING: 0

## 2024-05-21 DIAGNOSIS — M25.471 EDEMA OF BOTH ANKLES: Primary | ICD-10-CM

## 2024-05-21 DIAGNOSIS — M25.472 EDEMA OF BOTH ANKLES: Primary | ICD-10-CM

## 2024-05-21 RX ORDER — POTASSIUM CHLORIDE 750 MG/1
TABLET, FILM COATED, EXTENDED RELEASE ORAL
Qty: 30 TABLET | Refills: 1 | Status: SHIPPED | OUTPATIENT
Start: 2024-05-21

## 2024-05-29 DIAGNOSIS — E78.49 OTHER HYPERLIPIDEMIA: Primary | ICD-10-CM

## 2024-05-29 RX ORDER — EZETIMIBE 10 MG/1
10 TABLET ORAL DAILY
Qty: 30 TABLET | Refills: 5 | Status: SHIPPED | OUTPATIENT
Start: 2024-05-29

## 2024-10-21 ENCOUNTER — HOSPITAL ENCOUNTER (EMERGENCY)
Age: 64
Discharge: HOME | End: 2024-10-21
Payer: MEDICARE

## 2024-10-21 ENCOUNTER — APPOINTMENT (OUTPATIENT)
Dept: GENERAL RADIOLOGY | Age: 64
End: 2024-10-21
Payer: MEDICARE

## 2024-10-21 VITALS
HEART RATE: 89 BPM | TEMPERATURE: 97.4 F | OXYGEN SATURATION: 96 % | DIASTOLIC BLOOD PRESSURE: 85 MMHG | HEIGHT: 67 IN | BODY MASS INDEX: 32.49 KG/M2 | SYSTOLIC BLOOD PRESSURE: 146 MMHG | WEIGHT: 207 LBS | RESPIRATION RATE: 17 BRPM
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
## 2024-10-21 DIAGNOSIS — M25.561 ACUTE PAIN OF RIGHT KNEE: ICD-10-CM

## 2024-10-21 DIAGNOSIS — S63.502A SPRAIN OF LEFT WRIST, INITIAL ENCOUNTER: ICD-10-CM

## 2024-10-21 PROCEDURE — 73560 X-RAY EXAM OF KNEE 1 OR 2: CPT

## 2024-10-21 PROCEDURE — 73110 X-RAY EXAM OF WRIST: CPT

## 2024-10-21 PROCEDURE — 73130 X-RAY EXAM OF HAND: CPT

## 2024-10-21 PROCEDURE — 99283 EMERGENCY DEPT VISIT LOW MDM: CPT

## 2024-10-21 RX ORDER — IBUPROFEN 600 MG/1
600 TABLET, FILM COATED ORAL 4 TIMES DAILY PRN
Qty: 40 TABLET | Refills: 0 | Status: SHIPPED | OUTPATIENT
Start: 2024-10-21

## 2024-10-21 ASSESSMENT — PAIN DESCRIPTION - LOCATION: LOCATION: HAND;KNEE

## 2024-10-21 ASSESSMENT — PAIN SCALES - GENERAL: PAINLEVEL_OUTOF10: 7

## 2024-10-21 ASSESSMENT — PAIN - FUNCTIONAL ASSESSMENT: PAIN_FUNCTIONAL_ASSESSMENT: 0-10

## 2024-10-21 NOTE — ED PROVIDER NOTES
[facility] ED  EMERGENCY DEPARTMENT ENCOUNTER      Pt Name: Katy Ponce  MRN: [id number]  Birthdate 1960  Date of evaluation: 10/21/2024  Provider: ANILA Araujo CNP  2:02 PM EDT    CHIEF COMPLAINT       Chief Complaint   Patient presents with    Hand Injury     States left sided hand pain and right knee pain          HISTORY OF PRESENT ILLNESS   (Location/Symptom, Timing/Onset, Context/Setting, Quality, Duration, Modifying Factors, Severity)  Note limiting factors.   Katy Ponce is a 64 y.o. female who presents to the emergency department for evaluation status post fall.  Patient tells me around 11 AM this morning she tripped over a curb.  Patient tells me she tried to break her fall by catching himself with her left hand.  Patient tells me she bent her left wrist backwards and now she is experiencing pain.  Patient tells me she also landed on her right knee has been experiencing pain in the right knee as well.  Patient denies striking her head.  Patient denies loss of consciousness.  Patient denies use of blood thinners.  Patient has no other complaints on arrival.    The history is provided by the patient. No  was used.       Nursing Notes were reviewed.    REVIEW OF SYSTEMS    (2-9 systems for level 4, 10 or more for level 5)     Review of Systems   Musculoskeletal:         Left wrist and hand pain, right knee pain   All other systems reviewed and are negative.      Except as noted above the remainder of the review of systems was reviewed and negative.       PAST MEDICAL HISTORY     Past Medical History:   Diagnosis Date    Alopecia hereditaria     Cancer (HCC)     volva    Colon polyps 03/2024    DVT, lower extremity (HCC)     Lymphedema     Smoker          SURGICAL HISTORY       Past Surgical History:   Procedure Laterality Date    TOTAL KNEE ARTHROPLASTY Right 2006    VAGINA SURGERY           CURRENT MEDICATIONS       Previous Medications    ACETAMINOPHEN

## 2024-10-29 ENCOUNTER — HOSPITAL ENCOUNTER (OUTPATIENT)
Dept: RADIOLOGY | Facility: CLINIC | Age: 64
Discharge: HOME | End: 2024-10-29
Payer: MEDICARE

## 2024-10-29 ENCOUNTER — OFFICE VISIT (OUTPATIENT)
Dept: ORTHOPEDIC SURGERY | Facility: CLINIC | Age: 64
End: 2024-10-29
Payer: MEDICARE

## 2024-10-29 DIAGNOSIS — M79.642 PAIN OF LEFT HAND: ICD-10-CM

## 2024-10-29 DIAGNOSIS — S63.92XA HAND SPRAIN, LEFT, INITIAL ENCOUNTER: ICD-10-CM

## 2024-10-29 PROCEDURE — 99214 OFFICE O/P EST MOD 30 MIN: CPT | Performed by: ORTHOPAEDIC SURGERY

## 2024-10-29 PROCEDURE — 73130 X-RAY EXAM OF HAND: CPT | Mod: LT

## 2024-10-29 PROCEDURE — 73130 X-RAY EXAM OF HAND: CPT | Mod: LEFT SIDE | Performed by: ORTHOPAEDIC SURGERY

## 2024-10-29 PROCEDURE — 99203 OFFICE O/P NEW LOW 30 MIN: CPT | Performed by: ORTHOPAEDIC SURGERY

## 2024-11-04 ENCOUNTER — HOSPITAL ENCOUNTER (EMERGENCY)
Facility: HOSPITAL | Age: 64
Discharge: HOME | End: 2024-11-04
Payer: MEDICARE

## 2024-11-04 ENCOUNTER — APPOINTMENT (OUTPATIENT)
Dept: RADIOLOGY | Facility: HOSPITAL | Age: 64
End: 2024-11-04
Payer: MEDICARE

## 2024-11-04 VITALS
HEART RATE: 67 BPM | SYSTOLIC BLOOD PRESSURE: 151 MMHG | OXYGEN SATURATION: 96 % | HEIGHT: 67 IN | TEMPERATURE: 96.8 F | DIASTOLIC BLOOD PRESSURE: 83 MMHG | BODY MASS INDEX: 32.49 KG/M2 | RESPIRATION RATE: 16 BRPM | WEIGHT: 207 LBS

## 2024-11-04 DIAGNOSIS — S93.602A FOOT SPRAIN, LEFT, INITIAL ENCOUNTER: ICD-10-CM

## 2024-11-04 DIAGNOSIS — M25.561 ACUTE PAIN OF RIGHT KNEE: Primary | ICD-10-CM

## 2024-11-04 PROCEDURE — 73564 X-RAY EXAM KNEE 4 OR MORE: CPT | Mod: RIGHT SIDE

## 2024-11-04 PROCEDURE — 93971 EXTREMITY STUDY: CPT

## 2024-11-04 PROCEDURE — 99284 EMERGENCY DEPT VISIT MOD MDM: CPT | Mod: 25

## 2024-11-04 PROCEDURE — 73564 X-RAY EXAM KNEE 4 OR MORE: CPT | Mod: RT

## 2024-11-04 PROCEDURE — 73630 X-RAY EXAM OF FOOT: CPT | Mod: LEFT SIDE

## 2024-11-04 PROCEDURE — 73630 X-RAY EXAM OF FOOT: CPT | Mod: LT

## 2024-11-04 ASSESSMENT — LIFESTYLE VARIABLES
TOTAL SCORE: 0
EVER FELT BAD OR GUILTY ABOUT YOUR DRINKING: NO
HAVE PEOPLE ANNOYED YOU BY CRITICIZING YOUR DRINKING: NO
HAVE YOU EVER FELT YOU SHOULD CUT DOWN ON YOUR DRINKING: NO
EVER HAD A DRINK FIRST THING IN THE MORNING TO STEADY YOUR NERVES TO GET RID OF A HANGOVER: NO

## 2024-11-04 ASSESSMENT — COLUMBIA-SUICIDE SEVERITY RATING SCALE - C-SSRS
1. IN THE PAST MONTH, HAVE YOU WISHED YOU WERE DEAD OR WISHED YOU COULD GO TO SLEEP AND NOT WAKE UP?: NO
6. HAVE YOU EVER DONE ANYTHING, STARTED TO DO ANYTHING, OR PREPARED TO DO ANYTHING TO END YOUR LIFE?: NO
2. HAVE YOU ACTUALLY HAD ANY THOUGHTS OF KILLING YOURSELF?: NO

## 2024-11-04 ASSESSMENT — PAIN - FUNCTIONAL ASSESSMENT: PAIN_FUNCTIONAL_ASSESSMENT: 0-10

## 2024-11-04 ASSESSMENT — PAIN SCALES - GENERAL: PAINLEVEL_OUTOF10: 10 - WORST POSSIBLE PAIN

## 2024-11-04 NOTE — ED PROVIDER NOTES
"HPI   Chief Complaint   Patient presents with    Knee Pain     \"Here for right knee pain that has been going on since 10/10.  Left foot pain by the heel for 3-4 days.  I fell on the 10th and hurt knee, I fell directly on the knee.  I hit it after I fell.\"       64-year-old female presents emergency department, patient states she had a fall few weeks ago, continue to have pain right knee as well as left heel.  Patient states increased swelling of the right lower extremity and history of DVT, concerned she has a new DVT today.    Been ambulatory since the fall.  Patient states she does have history of right knee replacement.      History provided by:  Patient   used: No            Patient History   Past Medical History:   Diagnosis Date    Acute embolism and thrombosis of left popliteal vein ([facility])     Deep vein thrombosis (DVT) of popliteal vein of left lower extremity, unspecified chronicity    Anesthesia of skin 07/01/2020    Numbness and tingling    Pain in left shoulder     Acute pain of left shoulder    Personal history of (healed) traumatic fracture     History of fracture of ankle    Personal history of diseases of the blood and blood-forming organs and certain disorders involving the immune mechanism 07/01/2020    History of bleeding disorder    Personal history of malignant neoplasm of cervix uteri     History of malignant neoplasm of cervix    Personal history of other diseases of the nervous system and sense organs 07/01/2020    History of eye problem    Personal history of other diseases of the respiratory system     History of acute bronchitis    Personal history of other specified conditions 07/01/2020    History of chest pain    Unspecified disorder of ear, unspecified ear 07/01/2020    Ear, nose and throat disorder    Unspecified osteoarthritis, unspecified site 07/01/2020    Generalized arthritis     Past Surgical History:   Procedure Laterality Date    OTHER SURGICAL HISTORY  " 05/22/2019    Colonoscopy    OTHER SURGICAL HISTORY  05/22/2019    Tympanoplasty    OTHER SURGICAL HISTORY  05/22/2019    Knee replacement    OTHER SURGICAL HISTORY  05/22/2019    Tubal ligation    OTHER SURGICAL HISTORY  07/01/2020    Tonsillectomy    OTHER SURGICAL HISTORY  07/01/2020    Joint replacement procedure    US ASPIRATION INJECTION INTERMEDIATE JOINT  7/1/2020    US ASPIRATION INJECTION INTERMEDIATE JOINT 7/1/2020 [facility]    US ASPIRATION INJECTION INTERMEDIATE JOINT  1/19/2021    US ASPIRATION INJECTION INTERMEDIATE JOINT 1/19/2021 [facility]     No family history on file.  Social History     Tobacco Use    Smoking status: Every Day     Types: Cigarettes    Smokeless tobacco: Never   Vaping Use    Vaping status: Never Used   Substance Use Topics    Alcohol use: Never    Drug use: Yes     Types: Marijuana       Physical Exam   ED Triage Vitals [11/04/24 0807]   Temperature Heart Rate Respirations BP   36 °C (96.8 °F) 67 16 151/83      Pulse Ox Temp Source Heart Rate Source Patient Position   96 % Temporal Monitor Sitting      BP Location FiO2 (%)     Right arm --       Physical Exam    Gen.: Vitals noted. No distress. Afebrile.   Cardiac: Regular rate rhythm. No murmur.   Pulmonary: Equal breath sounds bilaterally. No adventitious breath sounds.   Abdomen: Soft, nontender, nonsurgical. Normoactive bowel sounds.   Back: Nontender throughout.   Lower extremity: Right knee: There is tenderness over the medial joint line. There is no tenderness over the lateral joint line. The extensor mechanism is intact. There is no obvious laxity. The remainder of the extremity, specifically, the tib-fib, ankle are nontender.  There is generalized swelling about the knee skin is intact. Is neurovascularly intact distally. There is no evidence of an intra-articular infection. Compartments are soft to palpation. There is no suggestion of DVT.  Left foot: Mild tenderness medial aspect of the heel skin  intact.  Nontender over the medial or lateral malleolus, nontender over the Achilles      ED Course & MDM   Diagnoses as of 11/04/24 1545   Acute pain of right knee   Foot sprain, left, initial encounter          Labs Reviewed - No data to display     XR foot left 3+ views   Final Result   1st metatarsal-phalangeal joint arthritis is unchanged             MACRO:   None        Signed by: Megha Paulino 11/4/2024 10:39 AM   Dictation workstation:   [id number]      XR knee right 4+ views   Final Result   Endoprosthesis with no acute abnormality             MACRO:   None        Signed by: Megha Paulino 11/4/2024 10:38 AM   Dictation workstation:   [id number]      Lower extremity venous duplex right   Final Result   Negative study.  No deep venous thrombosis of the  right lower   extremity.        MACRO:   None        Signed by: Megha Paulino 11/4/2024 9:58 AM   Dictation workstation:   [id number]               No data recorded                           Medical Decision Making  Patient complains of right knee and left foot pain, will obtain x-rays to evaluate for fracture.  Also concern for increased swelling about the right knee, concern for DVT as she has a history of this.  Ultrasound ordered to rule out DVT.    Ultimately imaging unremarkable for any acute findings.  Discussed with the patient, discussed continued ice and elevation, recommended close follow-up with Ortho.  Should return with any worsening symptoms or additional concerns.    Procedure  Procedures     Shahnaz Amanda, JAZZY-CNP  11/04/24 1547

## 2024-11-25 ENCOUNTER — APPOINTMENT (OUTPATIENT)
Dept: RADIOLOGY | Facility: HOSPITAL | Age: 64
End: 2024-11-25
Payer: MEDICARE

## 2024-11-25 ENCOUNTER — HOSPITAL ENCOUNTER (EMERGENCY)
Facility: HOSPITAL | Age: 64
Discharge: HOME | End: 2024-11-25
Payer: MEDICARE

## 2024-11-25 VITALS
TEMPERATURE: 97 F | BODY MASS INDEX: 32.02 KG/M2 | SYSTOLIC BLOOD PRESSURE: 146 MMHG | RESPIRATION RATE: 20 BRPM | DIASTOLIC BLOOD PRESSURE: 75 MMHG | HEIGHT: 67 IN | HEART RATE: 80 BPM | WEIGHT: 204 LBS | OXYGEN SATURATION: 97 %

## 2024-11-25 DIAGNOSIS — M72.2 PLANTAR FASCIITIS, LEFT: Primary | ICD-10-CM

## 2024-11-25 DIAGNOSIS — M77.32 HEEL SPUR, LEFT: ICD-10-CM

## 2024-11-25 PROCEDURE — 99283 EMERGENCY DEPT VISIT LOW MDM: CPT

## 2024-11-25 PROCEDURE — 2500000001 HC RX 250 WO HCPCS SELF ADMINISTERED DRUGS (ALT 637 FOR MEDICARE OP)

## 2024-11-25 PROCEDURE — 73630 X-RAY EXAM OF FOOT: CPT | Mod: LEFT SIDE | Performed by: RADIOLOGY

## 2024-11-25 PROCEDURE — 73630 X-RAY EXAM OF FOOT: CPT | Mod: LT

## 2024-11-25 RX ORDER — ACETAMINOPHEN 325 MG/1
975 TABLET ORAL ONCE
Status: COMPLETED | OUTPATIENT
Start: 2024-11-25 | End: 2024-11-25

## 2024-11-25 ASSESSMENT — PAIN - FUNCTIONAL ASSESSMENT: PAIN_FUNCTIONAL_ASSESSMENT: 0-10

## 2024-11-25 ASSESSMENT — PAIN SCALES - GENERAL: PAINLEVEL_OUTOF10: 10 - WORST POSSIBLE PAIN

## 2024-11-25 ASSESSMENT — PAIN DESCRIPTION - ORIENTATION: ORIENTATION: LEFT

## 2024-11-25 ASSESSMENT — PAIN DESCRIPTION - PAIN TYPE: TYPE: ACUTE PAIN

## 2024-11-25 ASSESSMENT — PAIN DESCRIPTION - FREQUENCY: FREQUENCY: CONSTANT/CONTINUOUS

## 2024-11-25 ASSESSMENT — COLUMBIA-SUICIDE SEVERITY RATING SCALE - C-SSRS
1. IN THE PAST MONTH, HAVE YOU WISHED YOU WERE DEAD OR WISHED YOU COULD GO TO SLEEP AND NOT WAKE UP?: NO
2. HAVE YOU ACTUALLY HAD ANY THOUGHTS OF KILLING YOURSELF?: NO
6. HAVE YOU EVER DONE ANYTHING, STARTED TO DO ANYTHING, OR PREPARED TO DO ANYTHING TO END YOUR LIFE?: NO

## 2024-11-25 ASSESSMENT — PAIN DESCRIPTION - PROGRESSION: CLINICAL_PROGRESSION: NOT CHANGED

## 2024-11-25 ASSESSMENT — PAIN DESCRIPTION - LOCATION: LOCATION: FOOT

## 2024-11-25 ASSESSMENT — PAIN DESCRIPTION - DESCRIPTORS: DESCRIPTORS: ACHING

## 2024-11-25 ASSESSMENT — PAIN DESCRIPTION - ONSET: ONSET: SUDDEN

## 2024-11-26 ENCOUNTER — OFFICE VISIT (OUTPATIENT)
Dept: ORTHOPEDIC SURGERY | Facility: CLINIC | Age: 64
End: 2024-11-26
Payer: MEDICARE

## 2024-11-26 VITALS — WEIGHT: 204 LBS | BODY MASS INDEX: 38.51 KG/M2 | HEIGHT: 61 IN

## 2024-11-26 DIAGNOSIS — M72.2 PLANTAR FASCIITIS OF LEFT FOOT: ICD-10-CM

## 2024-11-26 PROCEDURE — 99214 OFFICE O/P EST MOD 30 MIN: CPT | Performed by: FAMILY MEDICINE

## 2024-11-26 PROCEDURE — 3008F BODY MASS INDEX DOCD: CPT | Performed by: FAMILY MEDICINE

## 2024-11-26 PROCEDURE — 1159F MED LIST DOCD IN RCRD: CPT | Performed by: FAMILY MEDICINE

## 2024-11-26 RX ORDER — NAPROXEN 500 MG/1
500 TABLET ORAL
Qty: 28 TABLET | Refills: 0 | Status: SHIPPED | OUTPATIENT
Start: 2024-11-26 | End: 2024-12-10

## 2024-11-26 RX ORDER — METHYLPREDNISOLONE 4 MG/1
TABLET ORAL
Qty: 1 EACH | Refills: 0 | Status: SHIPPED | OUTPATIENT
Start: 2024-11-26

## 2024-11-26 ASSESSMENT — PATIENT HEALTH QUESTIONNAIRE - PHQ9
1. LITTLE INTEREST OR PLEASURE IN DOING THINGS: NOT AT ALL
SUM OF ALL RESPONSES TO PHQ9 QUESTIONS 1 AND 2: 0
2. FEELING DOWN, DEPRESSED OR HOPELESS: NOT AT ALL

## 2024-11-26 NOTE — ED PROVIDER NOTES
HPI   Chief Complaint   Patient presents with    Foot Injury     Left heal pain x 1 month.       HPI  64-year-old female with past medical history significant for DVT, acute embolism, ankle fracture and osteoarthritis presents to the emergency room with a chief complaint of left heel pain x 3 weeks.  Patient states she fell 1 month ago but mostly hurt her right knee, it was not till about a week later that both of her heels began to hurt.  Her right heel hurt for about a week but her left heel has been consistently hurting with increased pain.  She describes the pain as 10 out of 10, stabbing, shooting, aching, nonradiating that is worse in the morning when she first begins to walk.  She endorses mild numbness and tingling into her second toe on the left foot.  She has not tried pain medication.  She also states that she has been favoring her right knee and putting more pressure onto her left foot and thinks that may be contributing to the increased pain.  Patient denies calf pain, shortness of breath, chest pain, inability to walk, and numbness and tingling going up her leg.      Patient History   Past Medical History:   Diagnosis Date    Acute embolism and thrombosis of left popliteal vein (Multi)     Deep vein thrombosis (DVT) of popliteal vein of left lower extremity, unspecified chronicity    Anesthesia of skin 07/01/2020    Numbness and tingling    Pain in left shoulder     Acute pain of left shoulder    Personal history of (healed) traumatic fracture     History of fracture of ankle    Personal history of diseases of the blood and blood-forming organs and certain disorders involving the immune mechanism 07/01/2020    History of bleeding disorder    Personal history of malignant neoplasm of cervix uteri     History of malignant neoplasm of cervix    Personal history of other diseases of the nervous system and sense organs 07/01/2020    History of eye problem    Personal history of other diseases of the  respiratory system     History of acute bronchitis    Personal history of other specified conditions 07/01/2020    History of chest pain    Unspecified disorder of ear, unspecified ear 07/01/2020    Ear, nose and throat disorder    Unspecified osteoarthritis, unspecified site 07/01/2020    Generalized arthritis     Past Surgical History:   Procedure Laterality Date    OTHER SURGICAL HISTORY  05/22/2019    Colonoscopy    OTHER SURGICAL HISTORY  05/22/2019    Tympanoplasty    OTHER SURGICAL HISTORY  05/22/2019    Knee replacement    OTHER SURGICAL HISTORY  05/22/2019    Tubal ligation    OTHER SURGICAL HISTORY  07/01/2020    Tonsillectomy    OTHER SURGICAL HISTORY  07/01/2020    Joint replacement procedure    US ASPIRATION INJECTION INTERMEDIATE JOINT  7/1/2020    US ASPIRATION INJECTION INTERMEDIATE JOINT 7/1/2020 [facility]    US ASPIRATION INJECTION INTERMEDIATE JOINT  1/19/2021    US ASPIRATION INJECTION INTERMEDIATE JOINT 1/19/2021 [facility]     No family history on file.  Social History     Tobacco Use    Smoking status: Every Day     Types: Cigarettes    Smokeless tobacco: Never   Vaping Use    Vaping status: Never Used   Substance Use Topics    Alcohol use: Never    Drug use: Yes     Types: Marijuana       Physical Exam   ED Triage Vitals [11/25/24 1723]   Temperature Heart Rate Respirations BP   36.1 °C (97 °F) 80 20 146/75      Pulse Ox Temp Source Heart Rate Source Patient Position   97 % Temporal Monitor Sitting      BP Location FiO2 (%)     Right arm --       Physical Exam  Vitals and nursing note reviewed.   Constitutional:       General: She is not in acute distress.     Appearance: Normal appearance. She is obese. She is not ill-appearing or diaphoretic.   Cardiovascular:      Rate and Rhythm: Normal rate and regular rhythm.      Pulses: Normal pulses.           Dorsalis pedis pulses are 2+ on the left side.        Posterior tibial pulses are 2+ on the left side.      Heart sounds:  Normal heart sounds. No murmur heard.     No friction rub. No gallop.   Pulmonary:      Effort: Pulmonary effort is normal. No respiratory distress.      Breath sounds: Normal breath sounds. No wheezing.   Abdominal:      General: Abdomen is flat. Bowel sounds are normal. There is no distension.      Tenderness: There is no abdominal tenderness. There is no right CVA tenderness or left CVA tenderness.   Musculoskeletal:         General: Tenderness (On palpation to the left heel.) present. No signs of injury.      Cervical back: Normal range of motion. No tenderness.      Right lower leg: Edema present.      Left lower leg: Edema present.      Left foot: Normal range of motion. No deformity or foot drop.   Feet:      Left foot:      Skin integrity: Skin integrity normal. No ulcer, blister, skin breakdown, erythema or warmth.   Skin:     General: Skin is warm.      Capillary Refill: Capillary refill takes less than 2 seconds.      Findings: No rash.   Neurological:      General: No focal deficit present.      Mental Status: She is alert and oriented to person, place, and time.       Labs Reviewed - No data to display     XR foot left 3+ views   Final Result   Degenerative changes.  No findings of an acute process.   A small calcaneal spur.   Signed by Tin Cardona MD          ED Course & MDM   Diagnoses as of 11/25/24 1936   Plantar fasciitis, left   Heel spur, left       No data recorded     [place] Coma Scale Score: 15 (11/25/24 1722 : Donny Valle RN)                     Medical Decision Making  64-year-old female with past medical history significant for DVT, acute embolism, ankle fracture and osteoarthritis presents to the emergency room with a chief complaint of left heel pain x 3 weeks.     On initial exam patient is nontoxic, in no acute distress.  On physical exam she has tenderness to the left heel, as well as surrounding areas.  Neurovascularly intact.  Strong pulses.  She does exhibit bilateral  pitting edema, which is baseline.  X-ray of the foot was ordered which showed degenerative changes, no findings of acute process.  A small calcaneal spur was noted.  Patient was given Tylenol while in the emergency department for pain.  Vital stable    Given history and physical exam most likely diagnosis is plantar fasciitis likely exacerbated by a calcaneal heel spur.  Patient will be discharged with a referral to podiatry.  She was educated on treatment of plantar fasciitis as well as using NSAIDs, ice and heat, as well as stretching for pain relief until can be evaluated further.  She was given return precautions and was advised to follow-up with her primary care provider.  Patient demonstrated understanding, all questions and concerns were addressed, she is in agreement with the plan of care.    Procedure  Procedures     Keshav De Los Santos PA-C  11/25/24 2030

## 2024-11-26 NOTE — PROGRESS NOTES
Acute Injury New Patient Visit    CC:   Chief Complaint   Patient presents with    Left Foot - Pain     Left foot , xrays at [facility], fell a month ago at her nephews house . Heel area pain       HPI: Elvie is a 64 y.o.female who presents today with new complaints of acute worsening pain to the inside of her left heel.  She states that she had fallen about a month ago at the nephew's house had some mild discomfort x-rays at that time were negative for fracture or dislocation.  She continues to have some swelling and discomfort to the bottom of her heel.  She points to the insertion of the plantar fascia region as area most pain discomfort.  She was seen evaluated emergency department where x-rays were repeated demonstrating persistent calcaneal spur but no fracture.  She presents here today for further evaluation states pain worsens as she is on her feet during the day she also has worsening pain for several steps in the morning.        Review of Systems   GENERAL: Negative for malaise, significant weight loss, fever  MUSCULOSKELETAL: See HPI  NEURO: Negative for numbness / tingling     Past Medical History  Past Medical History:   Diagnosis Date    Acute embolism and thrombosis of left popliteal vein (Multi)     Deep vein thrombosis (DVT) of popliteal vein of left lower extremity, unspecified chronicity    Anesthesia of skin 07/01/2020    Numbness and tingling    Pain in left shoulder     Acute pain of left shoulder    Personal history of (healed) traumatic fracture     History of fracture of ankle    Personal history of diseases of the blood and blood-forming organs and certain disorders involving the immune mechanism 07/01/2020    History of bleeding disorder    Personal history of malignant neoplasm of cervix uteri     History of malignant neoplasm of cervix    Personal history of other diseases of the nervous system and sense organs 07/01/2020    History of eye problem    Personal history of other diseases of  the respiratory system     History of acute bronchitis    Personal history of other specified conditions 2020    History of chest pain    Unspecified disorder of ear, unspecified ear 2020    Ear, nose and throat disorder    Unspecified osteoarthritis, unspecified site 2020    Generalized arthritis       Medication review  Medication Documentation Review Audit       Reviewed by Luz Serrano MA (Medical Assistant) on 24 at 1344      Medication Order Taking? Sig Documenting Provider Last Dose Status   acetaminophen (Tylenol) tablet 975 mg [id number]   Keshav De Los Santos PA-C   24 1849                    Allergies  Allergies   Allergen Reactions    Codeine Itching    Amoxicillin GI Upset and Itching     Other reaction(s): GI Upset    Atorvastatin Other     Muscle pain    Cyclobenzaprine Itching    Oxycodone Itching    Prednisone GI Upset    Adhesive Rash    Adhesive Tape-Silicones Rash    Fluorouracil-Adhesive Bandage Itching, Rash and Swelling       Social History  Social History     Socioeconomic History    Marital status:      Spouse name: Not on file    Number of children: Not on file    Years of education: Not on file    Highest education level: Not on file   Occupational History    Not on file   Tobacco Use    Smoking status: Every Day     Types: Cigarettes    Smokeless tobacco: Never   Vaping Use    Vaping status: Never Used   Substance and Sexual Activity    Alcohol use: Never    Drug use: Yes     Types: Marijuana    Sexual activity: Not on file   Other Topics Concern    Not on file   Social History Narrative    Not on file     Social Drivers of Health     Financial Resource Strain: Low Risk  (2024)    Received from [facility]    Overall Financial Resource Strain (CARDIA)     Difficulty of Paying Living Expenses: Not hard at all   Food Insecurity: No Food Insecurity (2024)    Received from [facility]    Hunger  Vital Sign     Worried About Running Out of Food in the Last Year: Never true     Ran Out of Food in the Last Year: Never true   Transportation Needs: Unknown (5/13/2024)    Received from [facility]    PRAPARE - Transportation     Lack of Transportation (Medical): Not on file     Lack of Transportation (Non-Medical): No   Physical Activity: Insufficiently Active (5/13/2024)    Received from [facility]    Exercise Vital Sign     Days of Exercise per Week: 2 days     Minutes of Exercise per Session: 30 min   Stress: Not on file   Social Connections: Not on file   Intimate Partner Violence: Not on file   Housing Stability: Unknown (5/13/2024)    Received from [facility]    Housing Stability Vital Sign     Unable to Pay for Housing in the Last Year: Not on file     Number of Places Lived in the Last Year: Not on file     Unstable Housing in the Last Year: No       Surgical History  Past Surgical History:   Procedure Laterality Date    OTHER SURGICAL HISTORY  05/22/2019    Colonoscopy    OTHER SURGICAL HISTORY  05/22/2019    Tympanoplasty    OTHER SURGICAL HISTORY  05/22/2019    Knee replacement    OTHER SURGICAL HISTORY  05/22/2019    Tubal ligation    OTHER SURGICAL HISTORY  07/01/2020    Tonsillectomy    OTHER SURGICAL HISTORY  07/01/2020    Joint replacement procedure    US ASPIRATION INJECTION INTERMEDIATE JOINT  7/1/2020    US ASPIRATION INJECTION INTERMEDIATE JOINT 7/1/2020 [facility]    US ASPIRATION INJECTION INTERMEDIATE JOINT  1/19/2021    US ASPIRATION INJECTION INTERMEDIATE JOINT 1/19/2021 [facility]       Physical Exam:  GENERAL:  Patient is awake, alert, and oriented to person place and time.  Patient appears well nourished and well kept.  Affect Calm, Not Acutely Distressed.  HEENT:  Normocephalic, Atraumatic, EOMI  CARDIOVASCULAR:  Hemodynamically stable.  RESPIRATORY:  Normal respirations with unlabored  breathing.  NEURO: Gross sensation intact to the lower extremities bilaterally.  Extremity: Left heel exam: Tenderness palpation at the insertion of the plantar fascia equivocal calcaneal squeeze negative heel tap no pain at the Achilles tendon mild soft tissue swelling and some chronic degenerative changes about the ankle with no bony tenderness medially or laterally.  Antalgic gait noted.  Distal pulses and sensation are intact negative distal metatarsal squeeze no pain about the navicular bone.      Diagnostics: Previous x-rays reviewed negative for fracture or dislocation        Procedure: None  Procedures    Assessment:   Problem List Items Addressed This Visit    None  Visit Diagnoses       Plantar fasciitis of left foot        Relevant Medications    methylPREDNISolone (Medrol Dospak) 4 mg tablets    naproxen (Naprosyn) 500 mg tablet    Other Relevant Orders    Walking boot    Referral to Physical Therapy             Plan: Discussed a conservative nonoperative approach at this overuse soft tissue inflammation and irritation.  She is agreeable to a night splint walking boot and physical therapy.  Will offer her an oral steroid and anti-inflammatory.  Recommended ice massage and stretching multiple times throughout the day.  We will tentatively plan to see her back in 4 to 6 weeks for  repeat evaluation.  If worse or no better we can consider injection treatment further immobilization and/or further advanced imaging with an MRI.  She should call or return sooner with any issues.  Should she have significant discomfort or worsening discomfort after the oral steroid would likely obtain a stat MRI to rule out a calcaneal stress fracture.  Orders Placed This Encounter    Walking boot    Referral to Physical Therapy    methylPREDNISolone (Medrol Dospak) 4 mg tablets    naproxen (Naprosyn) 500 mg tablet      At the conclusion of the visit there were no further questions by the patient/family regarding their plan of  care.  Patient was instructed to call or return with any issues, questions, or concerns regarding their injury and/or treatment plan described above.     11/26/24 at 3:37 PM - Cole C Budinsky, MD    Office: [phone]    This note was prepared using voice recognition software.  The details of this note are correct and have been reviewed, and corrected to the best of my ability.  Some grammatical errors may persist related to the Dragon software.

## 2025-01-08 ENCOUNTER — APPOINTMENT (OUTPATIENT)
Dept: ORTHOPEDIC SURGERY | Facility: CLINIC | Age: 65
End: 2025-01-08
Payer: MEDICARE

## 2025-03-19 ENCOUNTER — OFFICE VISIT (OUTPATIENT)
Dept: PRIMARY CARE CLINIC | Age: 65
End: 2025-03-19

## 2025-03-19 VITALS
HEART RATE: 68 BPM | WEIGHT: 219 LBS | BODY MASS INDEX: 34.37 KG/M2 | HEIGHT: 67 IN | SYSTOLIC BLOOD PRESSURE: 138 MMHG | DIASTOLIC BLOOD PRESSURE: 80 MMHG | OXYGEN SATURATION: 100 %

## 2025-03-19 DIAGNOSIS — R73.9 HYPERGLYCEMIA: ICD-10-CM

## 2025-03-19 DIAGNOSIS — Z00.00 WELCOME TO MEDICARE PREVENTIVE VISIT: Primary | ICD-10-CM

## 2025-03-19 DIAGNOSIS — E78.49 OTHER HYPERLIPIDEMIA: ICD-10-CM

## 2025-03-19 DIAGNOSIS — J00 ACUTE NASOPHARYNGITIS: ICD-10-CM

## 2025-03-19 LAB
CHOLEST SERPL-MCNC: 237 MG/DL (ref 0–199)
ESTIMATED AVERAGE GLUCOSE: 123 MG/DL
HBA1C MFR BLD: 5.9 % (ref 4–6)
HDLC SERPL-MCNC: 73 MG/DL (ref 40–59)
LDLC SERPL CALC-MCNC: 147 MG/DL (ref 0–129)
TRIGL SERPL-MCNC: 85 MG/DL (ref 0–150)

## 2025-03-19 RX ORDER — AZITHROMYCIN 250 MG/1
TABLET, FILM COATED ORAL
Qty: 6 TABLET | Refills: 1 | OUTPATIENT
Start: 2025-03-19

## 2025-03-19 RX ORDER — AZITHROMYCIN 250 MG/1
TABLET, FILM COATED ORAL
Qty: 6 TABLET | Refills: 0 | Status: SHIPPED | OUTPATIENT
Start: 2025-03-19 | End: 2025-03-29

## 2025-03-19 RX ORDER — IPRATROPIUM BROMIDE 42 UG/1
2 SPRAY, METERED NASAL 3 TIMES DAILY PRN
Qty: 15 ML | Refills: 1 | Status: SHIPPED | OUTPATIENT
Start: 2025-03-19

## 2025-03-19 SDOH — ECONOMIC STABILITY: INCOME INSECURITY: IN THE LAST 12 MONTHS, WAS THERE A TIME WHEN YOU WERE NOT ABLE TO PAY THE MORTGAGE OR RENT ON TIME?: NO

## 2025-03-19 SDOH — ECONOMIC STABILITY: FOOD INSECURITY: WITHIN THE PAST 12 MONTHS, YOU WORRIED THAT YOUR FOOD WOULD RUN OUT BEFORE YOU GOT MONEY TO BUY MORE.: NEVER TRUE

## 2025-03-19 SDOH — ECONOMIC STABILITY: FOOD INSECURITY: WITHIN THE PAST 12 MONTHS, THE FOOD YOU BOUGHT JUST DIDN'T LAST AND YOU DIDN'T HAVE MONEY TO GET MORE.: NEVER TRUE

## 2025-03-19 SDOH — ECONOMIC STABILITY: TRANSPORTATION INSECURITY
IN THE PAST 12 MONTHS, HAS LACK OF TRANSPORTATION KEPT YOU FROM MEETINGS, WORK, OR FROM GETTING THINGS NEEDED FOR DAILY LIVING?: NO

## 2025-03-19 SDOH — ECONOMIC STABILITY: TRANSPORTATION INSECURITY
IN THE PAST 12 MONTHS, HAS THE LACK OF TRANSPORTATION KEPT YOU FROM MEDICAL APPOINTMENTS OR FROM GETTING MEDICATIONS?: NO

## 2025-03-19 ASSESSMENT — PATIENT HEALTH QUESTIONNAIRE - PHQ9
2. FEELING DOWN, DEPRESSED OR HOPELESS: NOT AT ALL
SUM OF ALL RESPONSES TO PHQ QUESTIONS 1-9: 0
1. LITTLE INTEREST OR PLEASURE IN DOING THINGS: NOT AT ALL
1. LITTLE INTEREST OR PLEASURE IN DOING THINGS: NOT AT ALL
SUM OF ALL RESPONSES TO PHQ QUESTIONS 1-9: 0
SUM OF ALL RESPONSES TO PHQ QUESTIONS 1-9: 0
SUM OF ALL RESPONSES TO PHQ9 QUESTIONS 1 & 2: 0
SUM OF ALL RESPONSES TO PHQ QUESTIONS 1-9: 0
2. FEELING DOWN, DEPRESSED OR HOPELESS: NOT AT ALL

## 2025-03-19 ASSESSMENT — LIFESTYLE VARIABLES
HOW MANY STANDARD DRINKS CONTAINING ALCOHOL DO YOU HAVE ON A TYPICAL DAY: PATIENT DOES NOT DRINK
HOW OFTEN DO YOU HAVE A DRINK CONTAINING ALCOHOL: NEVER

## 2025-03-19 NOTE — PROGRESS NOTES
Katy Ponce 64 y.o. female presents today with   Chief Complaint   Patient presents with    Cold Symptoms    Discuss Labs    Medicare AWV     Welcome Medicare     Annual wellness visit  Cold Symptoms   This is a new problem. The current episode started in the past 7 days. The problem has been waxing and waning. Associated symptoms include coughing and rhinorrhea. Pertinent negatives include no chest pain or rash.   Hyperlipidemia  This is a chronic problem. The current episode started more than 1 year ago. The problem is uncontrolled. Recent lipid tests were reviewed and are high. Pertinent negatives include no chest pain. Current antihyperlipidemic treatment includes statins. The current treatment provides mild improvement of lipids.       Past Medical History:   Diagnosis Date    Alopecia hereditaria     Cancer (HCC)     volva    Colon polyps 03/2024    DVT, lower extremity (HCC)     Lymphedema     Smoker      There are no active problems to display for this patient.    Past Surgical History:   Procedure Laterality Date    TOTAL KNEE ARTHROPLASTY Right 2006    VAGINA SURGERY       No family history on file.  Social History     Socioeconomic History    Marital status:      Spouse name: None    Number of children: None    Years of education: None    Highest education level: None   Tobacco Use    Smoking status: Every Day    Smokeless tobacco: Never   Substance and Sexual Activity    Alcohol use: No    Drug use: Yes     Types: Marijuana (Weed)    Sexual activity: Not Currently     Social Drivers of Health     Financial Resource Strain: Low Risk  (5/13/2024)    Overall Financial Resource Strain (CARDIA)     Difficulty of Paying Living Expenses: Not hard at all   Food Insecurity: No Food Insecurity (3/19/2025)    Hunger Vital Sign     Worried About Running Out of Food in the Last Year: Never true     Ran Out of Food in the Last Year: Never true   Transportation Needs: No Transportation Needs (3/19/2025)

## 2025-03-21 ENCOUNTER — TELEPHONE (OUTPATIENT)
Dept: PRIMARY CARE CLINIC | Age: 65
End: 2025-03-21

## 2025-03-21 DIAGNOSIS — E78.2 MIXED HYPERLIPIDEMIA: ICD-10-CM

## 2025-03-21 RX ORDER — ATORVASTATIN CALCIUM 10 MG/1
10 TABLET, FILM COATED ORAL DAILY
Qty: 30 TABLET | Refills: 5 | Status: CANCELLED | OUTPATIENT
Start: 2025-03-21

## 2025-03-22 ENCOUNTER — RESULTS FOLLOW-UP (OUTPATIENT)
Dept: PRIMARY CARE CLINIC | Age: 65
End: 2025-03-22

## 2025-03-24 DIAGNOSIS — E78.49 OTHER HYPERLIPIDEMIA: ICD-10-CM

## 2025-03-24 NOTE — TELEPHONE ENCOUNTER
Rx request   Requested Prescriptions     Pending Prescriptions Disp Refills    ezetimibe (ZETIA) 10 MG tablet 30 tablet 5     Sig: Take 1 tablet by mouth daily     LOV 3/19/2025  Next Visit Date:  No future appointments.    Patient does not want Lipitor/atorvastatin

## 2025-03-25 RX ORDER — EZETIMIBE 10 MG/1
10 TABLET ORAL DAILY
Qty: 30 TABLET | Refills: 11 | Status: SHIPPED | OUTPATIENT
Start: 2025-03-25

## 2025-03-27 ASSESSMENT — ENCOUNTER SYMPTOMS
ABDOMINAL DISTENTION: 0
BLOOD IN STOOL: 0
RHINORRHEA: 1
APNEA: 0
COUGH: 1
FACIAL SWELLING: 0
CHOKING: 0
PHOTOPHOBIA: 0

## 2025-04-12 DIAGNOSIS — J00 ACUTE NASOPHARYNGITIS: ICD-10-CM

## 2025-04-14 RX ORDER — IPRATROPIUM BROMIDE 42 UG/1
2 SPRAY, METERED NASAL 3 TIMES DAILY PRN
Qty: 15 ML | Refills: 2 | Status: SHIPPED | OUTPATIENT
Start: 2025-04-14

## 2025-06-24 ENCOUNTER — APPOINTMENT (OUTPATIENT)
Dept: GENERAL RADIOLOGY | Age: 65
End: 2025-06-24
Payer: MEDICARE

## 2025-06-24 ENCOUNTER — HOSPITAL ENCOUNTER (EMERGENCY)
Age: 65
Discharge: HOME | End: 2025-06-24
Payer: MEDICARE

## 2025-06-24 VITALS
RESPIRATION RATE: 16 BRPM | DIASTOLIC BLOOD PRESSURE: 76 MMHG | SYSTOLIC BLOOD PRESSURE: 140 MMHG | HEART RATE: 89 BPM | HEIGHT: 67 IN | TEMPERATURE: 97.7 F | BODY MASS INDEX: 33.27 KG/M2 | OXYGEN SATURATION: 98 % | WEIGHT: 212 LBS

## 2025-06-24 DIAGNOSIS — M79.641 RIGHT HAND PAIN: Primary | ICD-10-CM

## 2025-06-24 PROCEDURE — 73110 X-RAY EXAM OF WRIST: CPT

## 2025-06-24 PROCEDURE — 73130 X-RAY EXAM OF HAND: CPT

## 2025-06-24 PROCEDURE — 6370000000 HC RX 637 (ALT 250 FOR IP)

## 2025-06-24 PROCEDURE — 99283 EMERGENCY DEPT VISIT LOW MDM: CPT

## 2025-06-24 RX ORDER — ACETAMINOPHEN 500 MG
500 TABLET ORAL EVERY 6 HOURS PRN
Qty: 15 TABLET | Refills: 0 | Status: SHIPPED | OUTPATIENT
Start: 2025-06-24

## 2025-06-24 RX ORDER — ACETAMINOPHEN 500 MG
1000 TABLET ORAL ONCE
Status: COMPLETED | OUTPATIENT
Start: 2025-06-24 | End: 2025-06-24

## 2025-06-24 RX ADMIN — ACETAMINOPHEN 1000 MG: 500 TABLET ORAL at 17:11

## 2025-06-24 ASSESSMENT — ENCOUNTER SYMPTOMS
RESPIRATORY NEGATIVE: 1
EYES NEGATIVE: 1
ALLERGIC/IMMUNOLOGIC NEGATIVE: 1
GASTROINTESTINAL NEGATIVE: 1

## 2025-06-24 ASSESSMENT — PAIN DESCRIPTION - ORIENTATION: ORIENTATION: RIGHT

## 2025-06-24 ASSESSMENT — PAIN DESCRIPTION - DESCRIPTORS: DESCRIPTORS: THROBBING

## 2025-06-24 ASSESSMENT — PAIN SCALES - GENERAL
PAINLEVEL_OUTOF10: 6
PAINLEVEL_OUTOF10: 7

## 2025-06-24 ASSESSMENT — PAIN DESCRIPTION - LOCATION: LOCATION: WRIST;HAND

## 2025-06-24 ASSESSMENT — LIFESTYLE VARIABLES
HOW MANY STANDARD DRINKS CONTAINING ALCOHOL DO YOU HAVE ON A TYPICAL DAY: 1 OR 2
HOW OFTEN DO YOU HAVE A DRINK CONTAINING ALCOHOL: MONTHLY OR LESS

## 2025-06-24 ASSESSMENT — PAIN - FUNCTIONAL ASSESSMENT
PAIN_FUNCTIONAL_ASSESSMENT: 0-10
PAIN_FUNCTIONAL_ASSESSMENT: NONE - DENIES PAIN

## 2025-06-24 ASSESSMENT — PAIN DESCRIPTION - PAIN TYPE: TYPE: ACUTE PAIN

## 2025-06-24 NOTE — ED PROVIDER NOTES
[facility]  eMERGENCY dEPARTMENT eNCOUnter      Pt Name: Katy Ponce  MRN: [id number]  Birthdate 1960  Date of evaluation: 6/24/2025  Provider: ANILA Pastrana CNP        HISTORY OF PRESENT ILLNESS    Katy Ponce is a 64 y.o. female per chart review has ah/o of CA, and DVT here today for acute right wrist pain that started 7 hours ago. She said she fell against her refrigerator and caught her wrist on the door. She hit her wrist on the door and her fingers went back.She tried to take motrin at home without much relief. She denies numbness, tingling, or decreased sensation.     REVIEW OF SYSTEMS       Review of Systems   Constitutional:  Negative for chills and fever.   HENT: Negative.     Eyes: Negative.    Respiratory: Negative.     Cardiovascular: Negative.    Gastrointestinal: Negative.    Endocrine: Negative.    Genitourinary: Negative.    Musculoskeletal:         Right wrist pain   Skin: Negative.    Allergic/Immunologic: Negative.    Neurological: Negative.    Hematological: Negative.    Psychiatric/Behavioral: Negative.     All other systems reviewed and are negative.      Except as noted above the remainder of the review of systems was reviewed and negative.       PAST MEDICAL HISTORY     Past Medical History:   Diagnosis Date    Alopecia hereditaria     Cancer (HCC)     volva    Colon polyps 03/2024    DVT, lower extremity (HCC)     Lymphedema     Smoker          SURGICAL HISTORY       Past Surgical History:   Procedure Laterality Date    TOTAL KNEE ARTHROPLASTY Right 2006    VAGINA SURGERY           CURRENT MEDICATIONS       Previous Medications    ACETAMINOPHEN (TYLENOL) 500 MG TABLET    Take 2 tablets by mouth every 6 hours as needed for Pain or Fever    ATORVASTATIN (LIPITOR) 10 MG TABLET    Take 1 tablet by mouth daily    EZETIMIBE (ZETIA) 10 MG TABLET    Take 1 tablet by mouth daily    FUROSEMIDE (LASIX) 20 MG TABLET    Take as needed for swelling    IBUPROFEN

## 2025-06-24 NOTE — ED TRIAGE NOTES
A & Ox4. Skin warm and dry. Pt states she tripped forward and hit her right hand on the fridge this morning and bent her fingers back. Complains of pain to all fingers, hand and wrist. Denies falling. Ice intact

## 2025-08-07 ENCOUNTER — TELEPHONE (OUTPATIENT)
Dept: PHARMACY | Facility: CLINIC | Age: 65
End: 2025-08-07